# Patient Record
Sex: MALE | Race: WHITE | NOT HISPANIC OR LATINO | Employment: FULL TIME | URBAN - METROPOLITAN AREA
[De-identification: names, ages, dates, MRNs, and addresses within clinical notes are randomized per-mention and may not be internally consistent; named-entity substitution may affect disease eponyms.]

---

## 2023-04-06 LAB
ALANINE AMINOTRANSFERASE (SGPT) (U/L) IN SER/PLAS: 28 U/L (ref 10–52)
ALBUMIN (G/DL) IN SER/PLAS: 4.2 G/DL (ref 3.4–5)
ALKALINE PHOSPHATASE (U/L) IN SER/PLAS: 66 U/L (ref 33–136)
ANION GAP IN SER/PLAS: 14 MMOL/L (ref 10–20)
ASPARTATE AMINOTRANSFERASE (SGOT) (U/L) IN SER/PLAS: 23 U/L (ref 9–39)
BASOPHILS (10*3/UL) IN BLOOD BY AUTOMATED COUNT: 0.1 X10E9/L (ref 0–0.1)
BASOPHILS/100 LEUKOCYTES IN BLOOD BY AUTOMATED COUNT: 1.2 % (ref 0–2)
BILIRUBIN TOTAL (MG/DL) IN SER/PLAS: 1.5 MG/DL (ref 0–1.2)
CALCIUM (MG/DL) IN SER/PLAS: 9.5 MG/DL (ref 8.6–10.6)
CARBON DIOXIDE, TOTAL (MMOL/L) IN SER/PLAS: 26 MMOL/L (ref 21–32)
CHLORIDE (MMOL/L) IN SER/PLAS: 104 MMOL/L (ref 98–107)
CHOLESTEROL (MG/DL) IN SER/PLAS: 180 MG/DL (ref 0–199)
CHOLESTEROL IN HDL (MG/DL) IN SER/PLAS: 40.3 MG/DL
CHOLESTEROL/HDL RATIO: 4.5
CREATININE (MG/DL) IN SER/PLAS: 1.11 MG/DL (ref 0.5–1.3)
EOSINOPHILS (10*3/UL) IN BLOOD BY AUTOMATED COUNT: 0.21 X10E9/L (ref 0–0.7)
EOSINOPHILS/100 LEUKOCYTES IN BLOOD BY AUTOMATED COUNT: 2.6 % (ref 0–6)
ERYTHROCYTE DISTRIBUTION WIDTH (RATIO) BY AUTOMATED COUNT: 13.4 % (ref 11.5–14.5)
ERYTHROCYTE MEAN CORPUSCULAR HEMOGLOBIN CONCENTRATION (G/DL) BY AUTOMATED: 32.9 G/DL (ref 32–36)
ERYTHROCYTE MEAN CORPUSCULAR VOLUME (FL) BY AUTOMATED COUNT: 92 FL (ref 80–100)
ERYTHROCYTES (10*6/UL) IN BLOOD BY AUTOMATED COUNT: 5.19 X10E12/L (ref 4.5–5.9)
GFR MALE: 74 ML/MIN/1.73M2
GLUCOSE (MG/DL) IN SER/PLAS: 100 MG/DL (ref 74–99)
HEMATOCRIT (%) IN BLOOD BY AUTOMATED COUNT: 48 % (ref 41–52)
HEMOGLOBIN (G/DL) IN BLOOD: 15.8 G/DL (ref 13.5–17.5)
IMMATURE GRANULOCYTES/100 LEUKOCYTES IN BLOOD BY AUTOMATED COUNT: 0.4 % (ref 0–0.9)
LDL: 108 MG/DL (ref 0–99)
LEUKOCYTES (10*3/UL) IN BLOOD BY AUTOMATED COUNT: 8.1 X10E9/L (ref 4.4–11.3)
LYMPHOCYTES (10*3/UL) IN BLOOD BY AUTOMATED COUNT: 1.99 X10E9/L (ref 1.2–4.8)
LYMPHOCYTES/100 LEUKOCYTES IN BLOOD BY AUTOMATED COUNT: 24.5 % (ref 13–44)
MONOCYTES (10*3/UL) IN BLOOD BY AUTOMATED COUNT: 0.65 X10E9/L (ref 0.1–1)
MONOCYTES/100 LEUKOCYTES IN BLOOD BY AUTOMATED COUNT: 8 % (ref 2–10)
NEUTROPHILS (10*3/UL) IN BLOOD BY AUTOMATED COUNT: 5.14 X10E9/L (ref 1.2–7.7)
NEUTROPHILS/100 LEUKOCYTES IN BLOOD BY AUTOMATED COUNT: 63.3 % (ref 40–80)
NRBC (PER 100 WBCS) BY AUTOMATED COUNT: 0 /100 WBC (ref 0–0)
PLATELETS (10*3/UL) IN BLOOD AUTOMATED COUNT: 203 X10E9/L (ref 150–450)
POTASSIUM (MMOL/L) IN SER/PLAS: 4.3 MMOL/L (ref 3.5–5.3)
PROTEIN TOTAL: 7.1 G/DL (ref 6.4–8.2)
SODIUM (MMOL/L) IN SER/PLAS: 140 MMOL/L (ref 136–145)
TRIGLYCERIDE (MG/DL) IN SER/PLAS: 158 MG/DL (ref 0–149)
UREA NITROGEN (MG/DL) IN SER/PLAS: 15 MG/DL (ref 6–23)
VLDL: 32 MG/DL (ref 0–40)

## 2023-04-27 ENCOUNTER — OFFICE VISIT (OUTPATIENT)
Dept: PRIMARY CARE | Facility: CLINIC | Age: 63
End: 2023-04-27
Payer: COMMERCIAL

## 2023-04-27 ENCOUNTER — LAB (OUTPATIENT)
Dept: LAB | Facility: LAB | Age: 63
End: 2023-04-27
Payer: COMMERCIAL

## 2023-04-27 VITALS
SYSTOLIC BLOOD PRESSURE: 181 MMHG | WEIGHT: 232 LBS | HEIGHT: 72 IN | HEART RATE: 74 BPM | BODY MASS INDEX: 31.42 KG/M2 | OXYGEN SATURATION: 96 % | DIASTOLIC BLOOD PRESSURE: 88 MMHG

## 2023-04-27 DIAGNOSIS — Z00.00 WELL ADULT EXAM: Primary | ICD-10-CM

## 2023-04-27 DIAGNOSIS — Z00.00 WELL ADULT EXAM: ICD-10-CM

## 2023-04-27 PROCEDURE — 36415 COLL VENOUS BLD VENIPUNCTURE: CPT

## 2023-04-27 PROCEDURE — 84153 ASSAY OF PSA TOTAL: CPT

## 2023-04-27 PROCEDURE — 99396 PREV VISIT EST AGE 40-64: CPT | Performed by: FAMILY MEDICINE

## 2023-04-27 RX ORDER — PANTOPRAZOLE SODIUM 40 MG/1
40 TABLET, DELAYED RELEASE ORAL
COMMUNITY
Start: 2023-01-20 | End: 2024-04-29 | Stop reason: SDUPTHER

## 2023-04-27 RX ORDER — EPINEPHRINE 0.3 MG/.3ML
INJECTION SUBCUTANEOUS
COMMUNITY
Start: 2017-08-09 | End: 2023-12-15 | Stop reason: WASHOUT

## 2023-04-27 RX ORDER — ASPIRIN 81 MG/1
81 TABLET ORAL DAILY
COMMUNITY
End: 2023-12-18 | Stop reason: SDUPTHER

## 2023-04-27 RX ORDER — FAMOTIDINE 20 MG/1
1 TABLET, FILM COATED ORAL DAILY
COMMUNITY
Start: 2019-02-01 | End: 2023-12-15 | Stop reason: WASHOUT

## 2023-04-27 RX ORDER — PRAVASTATIN SODIUM 40 MG/1
1 TABLET ORAL DAILY
COMMUNITY
Start: 2021-11-23 | End: 2024-02-08 | Stop reason: SDUPTHER

## 2023-04-27 RX ORDER — METOPROLOL SUCCINATE 50 MG/1
1 TABLET, EXTENDED RELEASE ORAL DAILY
COMMUNITY
Start: 2022-12-28 | End: 2023-12-15 | Stop reason: WASHOUT

## 2023-04-27 RX ORDER — LOSARTAN POTASSIUM 25 MG/1
1 TABLET ORAL DAILY
COMMUNITY
Start: 2018-08-03 | End: 2023-12-18 | Stop reason: DRUGHIGH

## 2023-04-27 RX ORDER — ROSUVASTATIN CALCIUM 5 MG/1
5 TABLET, COATED ORAL
COMMUNITY
Start: 2018-05-08 | End: 2023-04-27 | Stop reason: SINTOL

## 2023-04-27 RX ORDER — APIXABAN 5 MG/1
1 TABLET, FILM COATED ORAL 2 TIMES DAILY
COMMUNITY
End: 2024-02-05 | Stop reason: SDUPTHER

## 2023-04-27 ASSESSMENT — ENCOUNTER SYMPTOMS
EYES NEGATIVE: 1
GASTROINTESTINAL NEGATIVE: 1
MUSCULOSKELETAL NEGATIVE: 1
CARDIOVASCULAR NEGATIVE: 1
CONSTITUTIONAL NEGATIVE: 1
PSYCHIATRIC NEGATIVE: 1
RESPIRATORY NEGATIVE: 1

## 2023-04-27 NOTE — PROGRESS NOTES
Subjective   Patient ID: Tani Mo is a 62 y.o. male who presents for Annual Exam.  HPI  Pt sp hosp with a fib and for physical exam patient has no history of issues since admission and discharge.  Patient had a colonoscopy had a couple polyps that were removed  Review of Systems   Constitutional: Negative.    HENT: Negative.     Eyes: Negative.    Respiratory: Negative.     Cardiovascular: Negative.    Gastrointestinal: Negative.    Genitourinary: Negative.    Musculoskeletal: Negative.    Psychiatric/Behavioral: Negative.         Objective   Physical Exam  General no acute process no icterus well-hydrated alert active oriented    HEENT normocephalic no palpable tenderness eyes pupils equal reactive light and accommodation extraocular muscles intact no icterus and/or erythema ears benign external auditory canal no gross deformities nose no discharge drainage erythema bleeding throat no erythema.    Heart regular rate and rhythm without S3-S4 or murmur    Lungs clear to auscultation x2 no rales or rhonchi    Abdomen soft nontender nondistended no palpable masses no organomegaly splenomegaly.    Integument no rash no lumps bumps or concerning lesions.    Neurologic no tics tremors or seizures no decreased range of motion or ataxia.    Musculoskeletal good range of motion no gross abnormalities noted  Assessment/Plan

## 2023-04-28 LAB — PROSTATE SPECIFIC AG (NG/ML) IN SER/PLAS: 0.33 NG/ML (ref 0–4)

## 2023-10-27 PROBLEM — I10 BENIGN ESSENTIAL HYPERTENSION: Status: ACTIVE | Noted: 2023-10-27

## 2023-10-27 PROBLEM — E78.5 HYPERLIPIDEMIA: Status: ACTIVE | Noted: 2023-10-27

## 2023-10-27 PROBLEM — K21.9 GERD (GASTROESOPHAGEAL REFLUX DISEASE): Status: ACTIVE | Noted: 2023-10-27

## 2023-10-27 PROBLEM — I25.10 CORONARY ARTERY DISEASE: Status: ACTIVE | Noted: 2023-10-27

## 2023-10-27 PROBLEM — I48.91 ATRIAL FIBRILLATION WITH RVR (MULTI): Status: ACTIVE | Noted: 2023-04-04

## 2023-10-27 PROBLEM — E78.00 HYPERCHOLESTEROLEMIA: Status: ACTIVE | Noted: 2023-10-27

## 2023-10-27 PROBLEM — R00.2 PALPITATIONS: Status: ACTIVE | Noted: 2023-10-27

## 2023-10-27 PROBLEM — I10 HTN (HYPERTENSION): Status: ACTIVE | Noted: 2023-10-27

## 2023-10-27 PROBLEM — R07.89 CHEST PAIN, MIDSTERNAL: Status: ACTIVE | Noted: 2023-10-27

## 2023-10-27 PROBLEM — I73.9 CLAUDICATION (CMS-HCC): Status: ACTIVE | Noted: 2023-10-27

## 2023-10-27 PROBLEM — I48.0 PAROXYSMAL ATRIAL FIBRILLATION (MULTI): Status: ACTIVE | Noted: 2023-10-27

## 2023-10-27 RX ORDER — METOPROLOL TARTRATE 50 MG/1
50 TABLET ORAL 2 TIMES DAILY
COMMUNITY
Start: 2021-12-27 | End: 2023-12-15 | Stop reason: WASHOUT

## 2023-10-27 RX ORDER — CLOPIDOGREL BISULFATE 75 MG/1
75 TABLET ORAL EVERY MORNING
COMMUNITY
End: 2023-12-15 | Stop reason: WASHOUT

## 2023-10-27 RX ORDER — UBIDECARENONE 30 MG
30 CAPSULE ORAL EVERY MORNING
COMMUNITY
End: 2023-12-15 | Stop reason: WASHOUT

## 2023-10-27 RX ORDER — METOPROLOL TARTRATE 25 MG/1
25 TABLET, FILM COATED ORAL 2 TIMES DAILY
COMMUNITY
End: 2023-12-15 | Stop reason: WASHOUT

## 2023-10-27 RX ORDER — SODIUM, POTASSIUM,MAG SULFATES 17.5-3.13G
SOLUTION, RECONSTITUTED, ORAL ORAL
COMMUNITY
Start: 2023-01-20 | End: 2023-12-18 | Stop reason: ALTCHOICE

## 2023-10-27 RX ORDER — EZETIMIBE 10 MG/1
10 TABLET ORAL EVERY MORNING
COMMUNITY
Start: 2023-06-09 | End: 2023-12-11

## 2023-10-27 RX ORDER — ACETAMINOPHEN 160 MG/5ML
SUSPENSION, ORAL (FINAL DOSE FORM) ORAL
COMMUNITY
Start: 2019-02-01

## 2023-10-27 RX ORDER — METOPROLOL SUCCINATE 25 MG/1
25 TABLET, EXTENDED RELEASE ORAL EVERY MORNING
COMMUNITY
Start: 2023-04-29 | End: 2023-12-15 | Stop reason: WASHOUT

## 2023-10-27 RX ORDER — DILTIAZEM HYDROCHLORIDE 180 MG/1
1 CAPSULE, EXTENDED RELEASE ORAL DAILY
COMMUNITY
Start: 2023-06-09 | End: 2023-12-15 | Stop reason: WASHOUT

## 2023-10-27 RX ORDER — PRAVASTATIN SODIUM 20 MG/1
20 TABLET ORAL DAILY
COMMUNITY
Start: 2021-12-25 | End: 2023-12-18 | Stop reason: ALTCHOICE

## 2023-10-27 RX ORDER — PANTOPRAZOLE SODIUM 20 MG/1
1 TABLET, DELAYED RELEASE ORAL DAILY
COMMUNITY
Start: 2022-11-17 | End: 2023-12-15 | Stop reason: WASHOUT

## 2023-10-30 ENCOUNTER — OFFICE VISIT (OUTPATIENT)
Dept: PRIMARY CARE | Facility: CLINIC | Age: 63
End: 2023-10-30
Payer: COMMERCIAL

## 2023-10-30 VITALS
OXYGEN SATURATION: 93 % | HEIGHT: 72 IN | HEART RATE: 64 BPM | WEIGHT: 235 LBS | BODY MASS INDEX: 31.83 KG/M2 | SYSTOLIC BLOOD PRESSURE: 162 MMHG | DIASTOLIC BLOOD PRESSURE: 90 MMHG

## 2023-10-30 DIAGNOSIS — I10 BENIGN ESSENTIAL HYPERTENSION: Primary | ICD-10-CM

## 2023-10-30 DIAGNOSIS — I25.10 CORONARY ARTERY DISEASE INVOLVING NATIVE HEART WITHOUT ANGINA PECTORIS, UNSPECIFIED VESSEL OR LESION TYPE: ICD-10-CM

## 2023-10-30 PROCEDURE — 3077F SYST BP >= 140 MM HG: CPT | Performed by: FAMILY MEDICINE

## 2023-10-30 PROCEDURE — 99213 OFFICE O/P EST LOW 20 MIN: CPT | Performed by: FAMILY MEDICINE

## 2023-10-30 PROCEDURE — 3080F DIAST BP >= 90 MM HG: CPT | Performed by: FAMILY MEDICINE

## 2023-10-30 ASSESSMENT — PATIENT HEALTH QUESTIONNAIRE - PHQ9
SUM OF ALL RESPONSES TO PHQ9 QUESTIONS 1 AND 2: 0
2. FEELING DOWN, DEPRESSED OR HOPELESS: NOT AT ALL
1. LITTLE INTEREST OR PLEASURE IN DOING THINGS: NOT AT ALL

## 2023-10-30 NOTE — PROGRESS NOTES
Subjective   Patient ID: Tani Mo is a 63 y.o. male who presents for Follow-up (6 months).  HPI  Doing well no acute process   Review of Systems   Constitutional: Negative.    HENT: Negative.     Respiratory: Negative.     Cardiovascular: Negative.    Gastrointestinal: Negative.    Genitourinary: Negative.    Musculoskeletal: Negative.    Neurological: Negative.      Patient in for follow-up of blood pressure asymptomatic at this time needs follow-up medications as well as blood work.  Objective   Physical Exam  General no acute process no icterus well-hydrated alert active oriented    HEENT normocephalic no palpable tenderness eyes pupils equal reactive light and accommodation extraocular muscles intact no icterus and/or erythema ears benign external auditory canal no gross deformities nose no discharge drainage erythema bleeding throat no erythema.    Heart regular rate and rhythm without S3-S4 or murmur    Lungs clear to auscultation x2 no rales or rhonchi    Abdomen soft nontender nondistended no palpable masses no organomegaly splenomegaly.    Integument no rash no lumps bumps or concerning lesions.    Neurologic no tics tremors or seizures no decreased range of motion or ataxia.    Musculoskeletal good range of motion no gross abnormalities noted  Assessment/Plan

## 2023-11-02 ASSESSMENT — ENCOUNTER SYMPTOMS
NEUROLOGICAL NEGATIVE: 1
MUSCULOSKELETAL NEGATIVE: 1
CARDIOVASCULAR NEGATIVE: 1
RESPIRATORY NEGATIVE: 1
CONSTITUTIONAL NEGATIVE: 1
GASTROINTESTINAL NEGATIVE: 1

## 2023-12-10 DIAGNOSIS — E78.49 OTHER HYPERLIPIDEMIA: ICD-10-CM

## 2023-12-10 DIAGNOSIS — I10 BENIGN ESSENTIAL HYPERTENSION: Primary | ICD-10-CM

## 2023-12-11 RX ORDER — DILTIAZEM HYDROCHLORIDE 180 MG/1
180 CAPSULE, COATED, EXTENDED RELEASE ORAL DAILY
Qty: 90 CAPSULE | Refills: 3 | Status: SHIPPED | OUTPATIENT
Start: 2023-12-11 | End: 2024-06-04

## 2023-12-11 RX ORDER — EZETIMIBE 10 MG/1
10 TABLET ORAL DAILY
Qty: 90 TABLET | Refills: 3 | Status: SHIPPED | OUTPATIENT
Start: 2023-12-11 | End: 2024-06-04

## 2023-12-15 PROBLEM — E78.00 HYPERCHOLESTEROLEMIA: Status: RESOLVED | Noted: 2023-10-27 | Resolved: 2023-12-15

## 2023-12-15 PROBLEM — I10 HTN (HYPERTENSION): Status: RESOLVED | Noted: 2023-10-27 | Resolved: 2023-12-15

## 2023-12-15 PROBLEM — I48.91 ATRIAL FIBRILLATION WITH RVR (MULTI): Status: RESOLVED | Noted: 2023-04-04 | Resolved: 2023-12-15

## 2023-12-18 ENCOUNTER — OFFICE VISIT (OUTPATIENT)
Dept: CARDIOLOGY | Facility: CLINIC | Age: 63
End: 2023-12-18
Payer: COMMERCIAL

## 2023-12-18 VITALS
SYSTOLIC BLOOD PRESSURE: 160 MMHG | BODY MASS INDEX: 31.83 KG/M2 | HEIGHT: 72 IN | DIASTOLIC BLOOD PRESSURE: 84 MMHG | OXYGEN SATURATION: 95 % | HEART RATE: 75 BPM | WEIGHT: 235 LBS

## 2023-12-18 DIAGNOSIS — I48.0 PAROXYSMAL ATRIAL FIBRILLATION (MULTI): ICD-10-CM

## 2023-12-18 DIAGNOSIS — I10 BENIGN ESSENTIAL HYPERTENSION: Primary | ICD-10-CM

## 2023-12-18 PROCEDURE — 3079F DIAST BP 80-89 MM HG: CPT | Performed by: INTERNAL MEDICINE

## 2023-12-18 PROCEDURE — 1159F MED LIST DOCD IN RCRD: CPT | Performed by: INTERNAL MEDICINE

## 2023-12-18 PROCEDURE — 93000 ELECTROCARDIOGRAM COMPLETE: CPT | Performed by: INTERNAL MEDICINE

## 2023-12-18 PROCEDURE — 3077F SYST BP >= 140 MM HG: CPT | Performed by: INTERNAL MEDICINE

## 2023-12-18 PROCEDURE — 99213 OFFICE O/P EST LOW 20 MIN: CPT | Performed by: INTERNAL MEDICINE

## 2023-12-18 RX ORDER — ASPIRIN 81 MG/1
81 TABLET ORAL DAILY
Qty: 90 TABLET | Refills: 3 | Status: SHIPPED | OUTPATIENT
Start: 2023-12-18 | End: 2024-12-17

## 2023-12-18 RX ORDER — LOSARTAN POTASSIUM 50 MG/1
50 TABLET ORAL DAILY
Qty: 90 TABLET | Refills: 3 | Status: SHIPPED | OUTPATIENT
Start: 2023-12-18 | End: 2024-12-17

## 2023-12-18 NOTE — ASSESSMENT & PLAN NOTE
1.  Paroxysmal atrial fibrillation: The patient underwent a cryo PVI procedure in November 2022.  He had 1 episode of atrial fibrillation with RVR in April 2023 and otherwise has maintained sinus rhythm.  Continue current medication regimen.    2.  Hypertension: Losartan will be increased to 50 mg daily.    3.  Coronary artery disease: The patient underwent successful revascularization of the RCA .  No angina at this time.  Continue same medication regimen and follow-up with Dr. Jones as scheduled.

## 2023-12-18 NOTE — PROGRESS NOTES
History Of Present Illness:      This is a 63-year-old male with a history of atrial fibrillation.  He has no complaints of palpitations, chest pain, or shortness of breath.  No recent episodes of atrial fibrillation.  Pulmonary vein isolation was performed November 17, 2022.    Past medical history:    Paroxysmal atrial fibrillation: History of frequent episodes of atrial fibrillation, at least 1 episode per month with episodes lasting 12 to 24 hours.  Pulmonary vein isolation performed November 2022.  Coronary artery disease with history of  PCI of the RCA in July 2018  Hypertension  Hyperlipidemia    Review of Systems  Other review of systems negative     Last Recorded Vitals:      12/9/2022     1:43 PM 12/28/2022    11:03 AM 4/5/2023    10:47 AM 4/27/2023     3:04 PM 6/9/2023     2:41 PM 10/30/2023     3:36 PM 12/18/2023     3:16 PM   Vitals   Systolic 123 140 148 181 120 162 160   Diastolic 77 92 98 88 68 90 84   Heart Rate 58 60 67 74 77 64 75   Height (in) 1.829 m (6') 1.829 m (6') 1.829 m (6') 1.829 m (6') 1.829 m (6') 1.829 m (6') 1.829 m (6')   Weight (lb) 233 233.13 230 232 228.38 235 235   BMI 31.6 kg/m2 31.62 kg/m2 31.19 kg/m2 31.46 kg/m2 30.97 kg/m2 31.87 kg/m2 31.87 kg/m2   BSA (m2) 2.32 m2 2.32 m2 2.3 m2 2.31 m2 2.3 m2 2.33 m2 2.33 m2   Visit Report    Report  Report Report          Allergies:  Bee venom protein (honey bee)    Outpatient Medications:  Current Outpatient Medications   Medication Instructions    aspirin 81 mg, oral, Daily    coenzyme Q-10 200 mg capsule TAKE AS DIRECTED.<BR>    dilTIAZem CD (CARDIZEM CD) 180 mg, oral, Daily    Eliquis 5 mg tablet 1 tablet, oral, 2 times daily    ezetimibe (ZETIA) 10 mg, oral, Daily    losartan (Cozaar) 25 mg tablet 1 tablet, oral, Daily    multivitamin (MULTIPLE VITAMINS ORAL) 1 tablet, oral, Daily    pantoprazole (PROTONIX) 40 mg, oral, Daily before breakfast    pravastatin (Pravachol) 40 mg tablet 1 tablet, oral, Daily    ubidecarenone (COENZYME  Q10, BULK, MISC) oral       Physical Exam:    General Appearance:  Alert, oriented, no distress  Skin:  Warm and dry  Head and Neck:  No elevation of JVP, no carotid bruits  Cardiac Exam:  Rhythm is regular, S1 and S2 are normal, no murmur S3 or S4  Lungs:  Clear to auscultation  Extremities:  no edema  Neurologic:  No focal deficits  Psychiatric:  Appropriate mood and behavior         Cardiology Tests:  I have personally review the diagnostic cardiac testing and my interpretation is as follows:    EKG December 18, 2023: Sinus rhythm, no ischemic changes  Echocardiogram September 2022: Normal left ventricular function.      Assessment/Plan   Problem List Items Addressed This Visit             ICD-10-CM    Benign essential hypertension - Primary I10    Paroxysmal atrial fibrillation (CMS/HCC) I48.0     1.  Paroxysmal atrial fibrillation: The patient underwent a cryo PVI procedure in November 2022.  He had 1 episode of atrial fibrillation with RVR in April 2023 and otherwise has maintained sinus rhythm.  Continue current medication regimen.    2.  Hypertension: Losartan will be increased to 50 mg daily.    3.  Coronary artery disease: The patient underwent successful revascularization of the RCA .  No angina at this time.  Continue same medication regimen and follow-up with Dr. Jones as scheduled.         Relevant Medications    aspirin 81 mg EC tablet    losartan (Cozaar) 50 mg tablet    Other Relevant Orders    ECG 12 lead (Clinic Performed) (Completed)       James C Ramicone, DO

## 2023-12-26 LAB
NON-UH HIE A/G RATIO: 1.3
NON-UH HIE ALB: 3.8 G/DL (ref 3.4–5)
NON-UH HIE ALK PHOS: 68 UNIT/L (ref 45–117)
NON-UH HIE BILIRUBIN, TOTAL: 0.8 MG/DL (ref 0.3–1.2)
NON-UH HIE BUN/CREAT RATIO: 13.6
NON-UH HIE BUN: 15 MG/DL (ref 9–23)
NON-UH HIE CALCIUM: 9.1 MG/DL (ref 8.7–10.4)
NON-UH HIE CALCULATED LDL CHOLESTEROL: 72 MG/DL (ref 60–130)
NON-UH HIE CALCULATED OSMOLALITY: 286 MOSM/KG (ref 275–295)
NON-UH HIE CHLORIDE: 109 MMOL/L (ref 98–107)
NON-UH HIE CHOLESTEROL: 132 MG/DL (ref 100–200)
NON-UH HIE CO2, VENOUS: 26 MMOL/L (ref 20–31)
NON-UH HIE CREATININE: 1.1 MG/DL (ref 0.6–1.1)
NON-UH HIE GFR AA: >60
NON-UH HIE GLOBULIN: 2.9 G/DL
NON-UH HIE GLOMERULAR FILTRATION RATE: >60 ML/MIN/1.73M?
NON-UH HIE GLUCOSE: 103 MG/DL (ref 74–106)
NON-UH HIE GOT: 22 UNIT/L (ref 15–37)
NON-UH HIE GPT: 30 UNIT/L (ref 10–49)
NON-UH HIE HDL CHOLESTEROL: 38 MG/DL (ref 40–60)
NON-UH HIE K: 4.3 MMOL/L (ref 3.5–5.1)
NON-UH HIE NA: 143 MMOL/L (ref 135–145)
NON-UH HIE TOTAL CHOL/HDL CHOL RATIO: 3.5
NON-UH HIE TOTAL PROTEIN: 6.7 G/DL (ref 5.7–8.2)
NON-UH HIE TRIGLYCERIDES: 109 MG/DL (ref 30–150)

## 2024-02-05 DIAGNOSIS — I48.0 PAROXYSMAL ATRIAL FIBRILLATION (MULTI): ICD-10-CM

## 2024-02-05 RX ORDER — APIXABAN 5 MG/1
5 TABLET, FILM COATED ORAL 2 TIMES DAILY
Qty: 180 TABLET | Refills: 3 | Status: SHIPPED | OUTPATIENT
Start: 2024-02-05

## 2024-02-08 DIAGNOSIS — E78.5 HYPERLIPIDEMIA, UNSPECIFIED HYPERLIPIDEMIA TYPE: ICD-10-CM

## 2024-02-08 RX ORDER — PRAVASTATIN SODIUM 40 MG/1
40 TABLET ORAL DAILY
Qty: 90 TABLET | Refills: 3 | Status: SHIPPED | OUTPATIENT
Start: 2024-02-08

## 2024-04-29 ENCOUNTER — HOSPITAL ENCOUNTER (OUTPATIENT)
Dept: RADIOLOGY | Facility: CLINIC | Age: 64
Discharge: HOME | End: 2024-04-29
Payer: COMMERCIAL

## 2024-04-29 ENCOUNTER — OFFICE VISIT (OUTPATIENT)
Dept: PRIMARY CARE | Facility: CLINIC | Age: 64
End: 2024-04-29
Payer: COMMERCIAL

## 2024-04-29 VITALS
HEART RATE: 69 BPM | OXYGEN SATURATION: 94 % | HEIGHT: 72 IN | BODY MASS INDEX: 30.75 KG/M2 | DIASTOLIC BLOOD PRESSURE: 74 MMHG | WEIGHT: 227 LBS | SYSTOLIC BLOOD PRESSURE: 118 MMHG

## 2024-04-29 DIAGNOSIS — G62.9 NEUROPATHY: ICD-10-CM

## 2024-04-29 DIAGNOSIS — E78.5 HYPERLIPIDEMIA, UNSPECIFIED HYPERLIPIDEMIA TYPE: Primary | ICD-10-CM

## 2024-04-29 DIAGNOSIS — G47.00 INSOMNIA, UNSPECIFIED TYPE: ICD-10-CM

## 2024-04-29 DIAGNOSIS — I48.0 PAROXYSMAL ATRIAL FIBRILLATION (MULTI): ICD-10-CM

## 2024-04-29 DIAGNOSIS — T78.40XS ALLERGIC REACTION, SEQUELA: ICD-10-CM

## 2024-04-29 DIAGNOSIS — K21.9 GASTROESOPHAGEAL REFLUX DISEASE WITHOUT ESOPHAGITIS: ICD-10-CM

## 2024-04-29 PROCEDURE — 99214 OFFICE O/P EST MOD 30 MIN: CPT | Performed by: FAMILY MEDICINE

## 2024-04-29 PROCEDURE — 1036F TOBACCO NON-USER: CPT | Performed by: FAMILY MEDICINE

## 2024-04-29 PROCEDURE — 1123F ACP DISCUSS/DSCN MKR DOCD: CPT | Performed by: FAMILY MEDICINE

## 2024-04-29 PROCEDURE — 1158F ADVNC CARE PLAN TLK DOCD: CPT | Performed by: FAMILY MEDICINE

## 2024-04-29 PROCEDURE — 72040 X-RAY EXAM NECK SPINE 2-3 VW: CPT

## 2024-04-29 PROCEDURE — 3078F DIAST BP <80 MM HG: CPT | Performed by: FAMILY MEDICINE

## 2024-04-29 PROCEDURE — 72040 X-RAY EXAM NECK SPINE 2-3 VW: CPT | Performed by: RADIOLOGY

## 2024-04-29 PROCEDURE — 3074F SYST BP LT 130 MM HG: CPT | Performed by: FAMILY MEDICINE

## 2024-04-29 RX ORDER — EPINEPHRINE 0.3 MG/.3ML
1 INJECTION SUBCUTANEOUS AS NEEDED
Qty: 2 EACH | Refills: 1 | Status: SHIPPED | OUTPATIENT
Start: 2024-04-29 | End: 2025-04-29

## 2024-04-29 RX ORDER — PANTOPRAZOLE SODIUM 40 MG/1
40 TABLET, DELAYED RELEASE ORAL
Qty: 30 TABLET | Refills: 1 | Status: SHIPPED | OUTPATIENT
Start: 2024-04-29

## 2024-04-29 ASSESSMENT — PATIENT HEALTH QUESTIONNAIRE - PHQ9
1. LITTLE INTEREST OR PLEASURE IN DOING THINGS: NOT AT ALL
SUM OF ALL RESPONSES TO PHQ9 QUESTIONS 1 AND 2: 0
2. FEELING DOWN, DEPRESSED OR HOPELESS: NOT AT ALL

## 2024-04-29 ASSESSMENT — ENCOUNTER SYMPTOMS
RESPIRATORY NEGATIVE: 1
ROS GI COMMENTS: GASTROESOPHAGEAL REFLUX DISEASE
NECK PAIN: 1
NUMBNESS: 1
GASTROINTESTINAL NEGATIVE: 1
ARTHRALGIAS: 1
CONSTITUTIONAL NEGATIVE: 1

## 2024-04-29 NOTE — PROGRESS NOTES
Subjective   Patient ID: Tani Mo is a 63 y.o. male who presents for Follow-up (6 months).  HPI  L arm issues with numbness intermittantly worse at night co gerd patient in for follow-up of hyperlipidemia paroxysmal atrial fibs hypertension.  Doing well on taking medications patient had blood work done in December which was good needs follow-up blood work in June I ordered that.    Patient also complains of left arm pain and numbness he says it worse at night when his arms over his head in the sleeping he wakes up he has some numbness and tingling into the left arm patient states he has no loss of strength no weakness.  Patient has no history of trauma does have some intermittent neck pain.  Will go ahead and get an EMG and x-ray of his neck and see how that comes out.    Patient also complains of recurrence of his gastroesophageal reflux we will put him back on pantoprazole.  Review of Systems   Constitutional: Negative.    HENT: Negative.     Respiratory: Negative.     Gastrointestinal: Negative.         Gastroesophageal reflux disease   Genitourinary: Negative.    Musculoskeletal:  Positive for arthralgias and neck pain.   Neurological:  Positive for numbness.       Objective   Physical Exam  General no acute process no icterus well-hydrated alert active oriented    HEENT normocephalic no palpable tenderness eyes pupils equal reactive light and accommodation extraocular muscles intact no icterus and/or erythema ears benign external auditory canal no gross deformities nose no discharge drainage erythema bleeding throat no erythema.    Heart regular rate and rhythm without S3-S4 or murmur    Lungs clear to auscultation x2 no rales or rhonchi    Abdomen soft nontender nondistended no palpable masses no organomegaly splenomegaly.    Integument no rash no lumps bumps or concerning lesions.    Neurologic no tics tremors or seizures no decreased range of motion or ataxia.    Musculoskeletal good range of motion  no gross abnormalities noted  Assessment/Plan   Problem List Items Addressed This Visit             ICD-10-CM    Hyperlipidemia - Primary E78.5    GERD (gastroesophageal reflux disease) K21.9    Relevant Medications    pantoprazole (ProtoNix) 40 mg EC tablet    Paroxysmal atrial fibrillation (Multi) I48.0     Other Visit Diagnoses         Codes    Insomnia, unspecified type     G47.00    Relevant Orders    Home sleep apnea test (HSAT)    Neuropathy     G62.9    Relevant Orders    EMG & nerve conduction                 Franklyn Velasquez DO 04/29/24 3:44 PM

## 2024-05-06 ENCOUNTER — TELEPHONE (OUTPATIENT)
Dept: PRIMARY CARE | Facility: CLINIC | Age: 64
End: 2024-05-06
Payer: COMMERCIAL

## 2024-05-06 NOTE — TELEPHONE ENCOUNTER
I left patient a message. He needs to move his appointment for 10/29/24 up, as Dr. Davison is not seeing patients that late.

## 2024-05-09 NOTE — RESULT ENCOUNTER NOTE
Osteoarthritis of the cervical spine.  They also see a possible bony lesion underneath the left jaw that will need follow-up in the office

## 2024-06-03 DIAGNOSIS — I10 BENIGN ESSENTIAL HYPERTENSION: ICD-10-CM

## 2024-06-04 DIAGNOSIS — E78.49 OTHER HYPERLIPIDEMIA: ICD-10-CM

## 2024-06-04 RX ORDER — DILTIAZEM HYDROCHLORIDE 180 MG/1
180 CAPSULE, COATED, EXTENDED RELEASE ORAL DAILY
Qty: 90 CAPSULE | Refills: 3 | Status: SHIPPED | OUTPATIENT
Start: 2024-06-04

## 2024-06-04 RX ORDER — EZETIMIBE 10 MG/1
10 TABLET ORAL DAILY
Qty: 90 TABLET | Refills: 3 | Status: SHIPPED | OUTPATIENT
Start: 2024-06-04

## 2024-06-28 DIAGNOSIS — K21.9 GASTROESOPHAGEAL REFLUX DISEASE WITHOUT ESOPHAGITIS: ICD-10-CM

## 2024-06-28 RX ORDER — PANTOPRAZOLE SODIUM 40 MG/1
40 TABLET, DELAYED RELEASE ORAL
Qty: 30 TABLET | Refills: 1 | Status: SHIPPED | OUTPATIENT
Start: 2024-06-28

## 2024-07-01 ENCOUNTER — PROCEDURE VISIT (OUTPATIENT)
Dept: SLEEP MEDICINE | Facility: CLINIC | Age: 64
End: 2024-07-01
Payer: COMMERCIAL

## 2024-07-01 DIAGNOSIS — G47.00 INSOMNIA, UNSPECIFIED TYPE: ICD-10-CM

## 2024-07-01 NOTE — PROGRESS NOTES
Type of Study: HOME SLEEP STUDY - NOMAD     The patient received equipment and instructions for use of the [facility] Nomad HSAT device. The patient was instructed how to apply the effort belts, cannula, thermistor. It was also explained how the Nomad and oximeter components work.  The patient was asked to record their sleep for an 8-hour period.     The patient was informed of their responsibility for the device and acknowledged this by signing the HSAT device contract. The patient was asked to return the device on 7/2/2024 between the hours of 6:30 PM- 6:30 AM to the [facility].     The patient was instructed to call 911 as usual for any medical- emergencies while at home.  The patient was also given a phone number for troubleshooting when using the device in case there were additional questions.

## 2024-07-30 ENCOUNTER — CLINICAL SUPPORT (OUTPATIENT)
Dept: SLEEP MEDICINE | Facility: CLINIC | Age: 64
End: 2024-07-30
Payer: COMMERCIAL

## 2024-07-30 DIAGNOSIS — G47.00 INSOMNIA, UNSPECIFIED TYPE: ICD-10-CM

## 2024-07-30 NOTE — PROGRESS NOTES
Type of Study: HOME SLEEP STUDY - NOMAD     The patient received equipment and instructions for use of the [facility] [facility] Nom[facility] HSAT device. The patient was instructed how to apply the effort belts, cannula, thermistor. It was also explained how the Nomad and oximeter components work.  The patient was asked to record their sleep for an 8-hour period.     The patient was informed of their responsibility for the device and acknowledged this by signing the HSAT device contract. The patient was asked to return the device on 07/31/2024 between the hours of 06:30 am-06:30 pm to the Sleep Center.     The patient was instructed to call 911 as usual for any medical- emergencies while at home.  The patient was also given a phone number for troubleshooting when using the device in case there were additional questions.

## 2024-08-02 ASSESSMENT — SLEEP AND FATIGUE QUESTIONNAIRES
ESS-CHAD TOTAL SCORE: 7
HOW LIKELY ARE YOU TO NOD OFF OR FALL ASLEEP WHILE SITTING QUIETLY AFTER LUNCH WITHOUT ALCOHOL: WOULD NEVER DOZE
HOW LIKELY ARE YOU TO NOD OFF OR FALL ASLEEP WHILE LYING DOWN TO REST IN THE AFTERNOON WHEN CIRCUMSTANCES PERMIT: MODERATE CHANCE OF DOZING
HOW LIKELY ARE YOU TO NOD OFF OR FALL ASLEEP WHILE WATCHING TV: SLIGHT CHANCE OF DOZING
SITING INACTIVE IN A PUBLIC PLACE LIKE A CLASS ROOM OR A MOVIE THEATER: MODERATE CHANCE OF DOZING
HOW LIKELY ARE YOU TO NOD OFF OR FALL ASLEEP WHILE SITTING AND TALKING TO SOMEONE: WOULD NEVER DOZE
HOW LIKELY ARE YOU TO NOD OFF OR FALL ASLEEP WHEN YOU ARE A PASSENGER IN A CAR FOR AN HOUR WITHOUT A BREAK: WOULD NEVER DOZE
HOW LIKELY ARE YOU TO NOD OFF OR FALL ASLEEP IN A CAR, WHILE STOPPED FOR A FEW MINUTES IN TRAFFIC: WOULD NEVER DOZE
HOW LIKELY ARE YOU TO NOD OFF OR FALL ASLEEP WHILE SITTING AND READING: MODERATE CHANCE OF DOZING

## 2024-08-28 ENCOUNTER — APPOINTMENT (OUTPATIENT)
Dept: CARDIOLOGY | Facility: CLINIC | Age: 64
End: 2024-08-28
Payer: COMMERCIAL

## 2024-09-05 NOTE — PROGRESS NOTES
Patient had an inconclusive HSAT sleep study.    Patient should be rescheduled for repeat attempt of HSAT or an in-lab sleep study.

## 2024-09-06 DIAGNOSIS — K21.9 GASTROESOPHAGEAL REFLUX DISEASE WITHOUT ESOPHAGITIS: ICD-10-CM

## 2024-09-06 RX ORDER — PANTOPRAZOLE SODIUM 40 MG/1
40 TABLET, DELAYED RELEASE ORAL
Qty: 30 TABLET | Refills: 1 | Status: SHIPPED | OUTPATIENT
Start: 2024-09-06

## 2024-09-10 ENCOUNTER — APPOINTMENT (OUTPATIENT)
Dept: CARDIOLOGY | Facility: CLINIC | Age: 64
End: 2024-09-10
Payer: COMMERCIAL

## 2024-09-10 VITALS
DIASTOLIC BLOOD PRESSURE: 78 MMHG | BODY MASS INDEX: 31.13 KG/M2 | SYSTOLIC BLOOD PRESSURE: 138 MMHG | WEIGHT: 229.8 LBS | OXYGEN SATURATION: 95 % | HEIGHT: 72 IN | HEART RATE: 71 BPM

## 2024-09-10 DIAGNOSIS — I10 BENIGN ESSENTIAL HYPERTENSION: ICD-10-CM

## 2024-09-10 DIAGNOSIS — I48.0 PAROXYSMAL ATRIAL FIBRILLATION (MULTI): ICD-10-CM

## 2024-09-10 DIAGNOSIS — E78.49 OTHER HYPERLIPIDEMIA: ICD-10-CM

## 2024-09-10 DIAGNOSIS — I25.10 CORONARY ARTERY DISEASE INVOLVING NATIVE CORONARY ARTERY OF NATIVE HEART WITHOUT ANGINA PECTORIS: Primary | ICD-10-CM

## 2024-09-10 PROCEDURE — 3008F BODY MASS INDEX DOCD: CPT | Performed by: INTERNAL MEDICINE

## 2024-09-10 PROCEDURE — 99214 OFFICE O/P EST MOD 30 MIN: CPT | Performed by: INTERNAL MEDICINE

## 2024-09-10 PROCEDURE — 1123F ACP DISCUSS/DSCN MKR DOCD: CPT | Performed by: INTERNAL MEDICINE

## 2024-09-10 PROCEDURE — 1159F MED LIST DOCD IN RCRD: CPT | Performed by: INTERNAL MEDICINE

## 2024-09-10 PROCEDURE — 1036F TOBACCO NON-USER: CPT | Performed by: INTERNAL MEDICINE

## 2024-09-10 PROCEDURE — 3078F DIAST BP <80 MM HG: CPT | Performed by: INTERNAL MEDICINE

## 2024-09-10 PROCEDURE — 3075F SYST BP GE 130 - 139MM HG: CPT | Performed by: INTERNAL MEDICINE

## 2024-09-10 RX ORDER — PRAVASTATIN SODIUM 40 MG/1
40 TABLET ORAL DAILY
Qty: 30 TABLET | Refills: 11 | Status: SHIPPED | OUTPATIENT
Start: 2024-09-10 | End: 2024-09-12

## 2024-09-10 RX ORDER — LOSARTAN POTASSIUM 50 MG/1
50 TABLET ORAL DAILY
Qty: 90 TABLET | Refills: 3 | Status: SHIPPED | OUTPATIENT
Start: 2024-09-10

## 2024-09-10 RX ORDER — EZETIMIBE 10 MG/1
10 TABLET ORAL DAILY
Qty: 30 TABLET | Refills: 11 | Status: SHIPPED | OUTPATIENT
Start: 2024-09-10 | End: 2024-09-12

## 2024-09-10 RX ORDER — APIXABAN 5 MG/1
5 TABLET, FILM COATED ORAL 2 TIMES DAILY
Qty: 60 TABLET | Refills: 11 | Status: SHIPPED | OUTPATIENT
Start: 2024-09-10 | End: 2025-09-10

## 2024-09-10 RX ORDER — LORATADINE 10 MG/1
10 TABLET ORAL DAILY
COMMUNITY
Start: 2023-12-24

## 2024-09-10 RX ORDER — LOSARTAN POTASSIUM 50 MG/1
50 TABLET ORAL DAILY
Qty: 30 TABLET | Refills: 11 | Status: SHIPPED | OUTPATIENT
Start: 2024-09-10 | End: 2024-09-10

## 2024-09-10 RX ORDER — DICLOFENAC SODIUM 10 MG/G
2 GEL TOPICAL 4 TIMES DAILY
COMMUNITY
Start: 2024-08-15

## 2024-09-10 RX ORDER — ASPIRIN 81 MG/1
81 TABLET ORAL DAILY
Qty: 30 TABLET | Refills: 11 | Status: SHIPPED | OUTPATIENT
Start: 2024-09-10 | End: 2025-09-10

## 2024-09-10 RX ORDER — DILTIAZEM HYDROCHLORIDE 180 MG/1
180 CAPSULE, COATED, EXTENDED RELEASE ORAL DAILY
Qty: 30 CAPSULE | Refills: 11 | Status: SHIPPED | OUTPATIENT
Start: 2024-09-10 | End: 2025-09-10

## 2024-09-10 NOTE — PROGRESS NOTES
[facility] Cardiology Outpatient Follow-up Visit     Reason for Visit: CAD, AF    HPI: Tani Mo is a 63 y.o.  male who presents today for followup.     Patient is a 63-year-old male with a history of dyslipidemia and CAD. He presents for followup for his chronic total occlusion. His symptoms of substernal chest pressure and heaviness primarily brought on by stress have resolved since  PCI. He presented in December 2021 Covid positive in atrial fibrillation with RVR. He had severe symptoms including palpitations, chest discomfort and shortness of breath. He is placed on medical therapy and anticoagulation. He spontaneously converted. He had an admission in April 2023 with atrial fibrillation with RVR. He presents today for follow-up. He denies any chest discomfort. He admits dyspnea on exertion. He denies any lightheadedness, syncope, orthopnea, PND. His myalgias in his thighs have not come back since increasing statin therapy. His fatigue has improved after changing beta-blockade over to Cardizem.     Cardiac catheterization 2018 demonstrated a  of the RCA with mild LAD and circumflex artery disease. This was in response to an abnormal stress test done prior to catheterization. He underwent  PCI right coronary artery July 2018.  11/23/2021 ECG: Normal sinus rhythm, otherwise normal.  12/26/2021 echo: Ejection fraction 55 to 60%, mild LVH.  1/4/2022 ECG: Sinus bradycardia, otherwise normal.  7/3/2022 noninvasive stress test: Normal perfusion, ejection fraction 50%  9/9/2022 echocardiogram: Ejection fraction 60 to 65%, impaired relaxation.  1/5/2023 MACRINA: Negative for arterial occlusive disease.  4/5/23 , , HDL 40, triglycerides 158.     We take care of his mother, Jesús Mo. He works as a  repairing industrial machines.     Past Medical History:   He has a past medical history of Personal history of other medical treatment and Personal history of other medical  treatment.    Surgical History:   He has no past surgical history on file.    Family History:   Family History   Problem Relation Name Age of Onset    Coronary artery disease Mother      Coronary artery disease Father      Other (CABG) Father       Allergies:  Bee venom protein (honey bee)     Social History:      · Caffeine use (V49.89) (Z78.9)   · Does not use illicit drugs (V49.89) (Z78.9)   · Former smoker (V15.82) (Z87.891)   ·    · Moderate alcohol use    Prior Cardiovascular Testing (Personally Reviewed):     Review of Systems:  Review of Systems   All other systems reviewed and are negative.      Outpatient Medications:    Current Outpatient Medications:     aspirin 81 mg EC tablet, Take 1 tablet (81 mg) by mouth once daily., Disp: 90 tablet, Rfl: 3    coenzyme Q-10 200 mg capsule, TAKE AS DIRECTED., Disp: , Rfl:     dilTIAZem CD (Cardizem CD) 180 mg 24 hr capsule, TAKE 1 CAPSULE BY MOUTH EVERY DAY, Disp: 90 capsule, Rfl: 3    Eliquis 5 mg tablet, Take 1 tablet (5 mg) by mouth 2 times a day., Disp: 180 tablet, Rfl: 3    EPINEPHrine (Epipen) 0.3 mg/0.3 mL injection syringe, Inject 0.3 mL (0.3 mg) into the muscle if needed for anaphylaxis. Call 911 after use., Disp: 2 each, Rfl: 1    ezetimibe (Zetia) 10 mg tablet, TAKE 1 TABLET BY MOUTH ONCE DAILY, Disp: 90 tablet, Rfl: 3    losartan (Cozaar) 50 mg tablet, Take 1 tablet (50 mg) by mouth once daily., Disp: 90 tablet, Rfl: 3    multivitamin (MULTIPLE VITAMINS ORAL), Take 1 tablet by mouth once daily., Disp: , Rfl:     pantoprazole (ProtoNix) 40 mg EC tablet, Take 1 tablet (40 mg) by mouth once daily in the morning. Take before meals., Disp: 30 tablet, Rfl: 1    pravastatin (Pravachol) 40 mg tablet, Take 1 tablet (40 mg) by mouth once daily., Disp: 90 tablet, Rfl: 3    ubidecarenone (COENZYME Q10, BULK, MISC), Take by mouth., Disp: , Rfl:      Last Recorded Vitals  There were no vitals taken for this visit.    Physical Exam:    Physical Exam  Vitals  "reviewed.   Constitutional:       Appearance: Normal appearance.   HENT:      Head: Normocephalic and atraumatic.      Mouth/Throat:      Mouth: Mucous membranes are moist.      Pharynx: Oropharynx is clear.   Eyes:      Extraocular Movements: Extraocular movements intact.      Conjunctiva/sclera: Conjunctivae normal.   Cardiovascular:      Rate and Rhythm: Normal rate and regular rhythm.      Pulses: Normal pulses.      Heart sounds: Normal heart sounds.   Pulmonary:      Effort: Pulmonary effort is normal.      Breath sounds: Normal breath sounds.   Abdominal:      General: Bowel sounds are normal.      Palpations: Abdomen is soft.   Musculoskeletal:         General: No swelling.      Cervical back: Neck supple.   Skin:     General: Skin is warm and dry.   Neurological:      General: No focal deficit present.      Mental Status: He is alert.   Psychiatric:         Mood and Affect: Mood normal.         Behavior: Behavior normal.         Lab/Radiology/Diagnostic Review:    Labs    Lab Results   Component Value Date    GLUCOSE 100 (H) 04/05/2023    CALCIUM 9.5 04/05/2023     04/05/2023    K 4.3 04/05/2023    CO2 26 04/05/2023     04/05/2023    BUN 15 04/05/2023    CREATININE 1.11 04/05/2023       Lab Results   Component Value Date    WBC 8.1 04/05/2023    HGB 15.8 04/05/2023    HCT 48.0 04/05/2023    MCV 92 04/05/2023     04/05/2023       Lab Results   Component Value Date    CHOL 180 04/05/2023     Lab Results   Component Value Date    HDL 40.3 04/05/2023     No results found for: \"LDLCALC\"  Lab Results   Component Value Date    TRIG 158 (H) 04/05/2023     No components found for: \"CHOLHDL\"    No results found for: \"BNP\"    No results found for: \"TSH\"    Assessment:   Patient's heart rate and blood pressure well controlled.     He underwent successful revascularization of his  of the RCA with RAYSA. He'll remain on aspirin therapy given the need for anticoagulation.     Patient's noninvasive " stress test and echo are unremarkable.      He is post cryoablation for PAF. He had an episode of atrial fibrillation in April 2023. Continue Cardizem extended release 180 mg daily. This can be titrated up as needed.     Patient is unwilling to take a PCSK9 inhibitor. Higher doses of statin therapy in the past were unsuccessful secondary to significant myalgias.  He is least willing to think about Leqvio if his LDL is greater than 70.     We'll see him back in 12 months or sooner if he has more problems.     Tramaine Jones MD

## 2024-09-10 NOTE — LETTER
September 10, 2024     Franklyn Velasquez DO  [address]  [facility] [address]    Patient: Tani Mo   YOB: 1960   Date of Visit: 9/10/2024       Dear Dr. Franklyn Velasquez, :    Thank you for referring Tani Mo to me for evaluation. Below are my notes for this consultation.  If you have questions, please do not hesitate to call me. I look forward to following your patient along with you.       Sincerely,     Tramaine Jones MD      CC: No Recipients  ______________________________________________________________________________________        [facility] Cardiology Outpatient Follow-up Visit     Reason for Visit: CAD, AF    HPI: aTni Mo is a 63 y.o.  male who presents today for followup.     Patient is a 63-year-old male with a history of dyslipidemia and CAD. He presents for followup for his chronic total occlusion. His symptoms of substernal chest pressure and heaviness primarily brought on by stress have resolved since  PCI. He presented in December 2021 Covid positive in atrial fibrillation with RVR. He had severe symptoms including palpitations, chest discomfort and shortness of breath. He is placed on medical therapy and anticoagulation. He spontaneously converted. He had an admission in April 2023 with atrial fibrillation with RVR. He presents today for follow-up. He denies any chest discomfort. He admits dyspnea on exertion. He denies any lightheadedness, syncope, orthopnea, PND. His myalgias in his thighs have not come back since increasing statin therapy. His fatigue has improved after changing beta-blockade over to Cardizem.     Cardiac catheterization 2018 demonstrated a  of the RCA with mild LAD and circumflex artery disease. This was in response to an abnormal stress test done prior to catheterization. He underwent  PCI right coronary artery July 2018.  11/23/2021 ECG: Normal sinus rhythm, otherwise normal.  12/26/2021 echo: Ejection  fraction 55 to 60%, mild LVH.  1/4/2022 ECG: Sinus bradycardia, otherwise normal.  7/3/2022 noninvasive stress test: Normal perfusion, ejection fraction 50%  9/9/2022 echocardiogram: Ejection fraction 60 to 65%, impaired relaxation.  1/5/2023 MACRINA: Negative for arterial occlusive disease.  4/5/23 , , HDL 40, triglycerides 158.     We take care of his mother, Jesús Mo. He works as a  repairing industrial machines.     Past Medical History:   He has a past medical history of Personal history of other medical treatment and Personal history of other medical treatment.    Surgical History:   He has no past surgical history on file.    Family History:   Family History   Problem Relation Name Age of Onset   • Coronary artery disease Mother     • Coronary artery disease Father     • Other (CABG) Father       Allergies:  Bee venom protein (honey bee)     Social History:      · Caffeine use (V49.89) (Z78.9)   · Does not use illicit drugs (V49.89) (Z78.9)   · Former smoker (V15.82) (Z87.891)   ·    · Moderate alcohol use    Prior Cardiovascular Testing (Personally Reviewed):     Review of Systems:  Review of Systems   All other systems reviewed and are negative.      Outpatient Medications:    Current Outpatient Medications:   •  aspirin 81 mg EC tablet, Take 1 tablet (81 mg) by mouth once daily., Disp: 90 tablet, Rfl: 3  •  coenzyme Q-10 200 mg capsule, TAKE AS DIRECTED., Disp: , Rfl:   •  dilTIAZem CD (Cardizem CD) 180 mg 24 hr capsule, TAKE 1 CAPSULE BY MOUTH EVERY DAY, Disp: 90 capsule, Rfl: 3  •  Eliquis 5 mg tablet, Take 1 tablet (5 mg) by mouth 2 times a day., Disp: 180 tablet, Rfl: 3  •  EPINEPHrine (Epipen) 0.3 mg/0.3 mL injection syringe, Inject 0.3 mL (0.3 mg) into the muscle if needed for anaphylaxis. Call 911 after use., Disp: 2 each, Rfl: 1  •  ezetimibe (Zetia) 10 mg tablet, TAKE 1 TABLET BY MOUTH ONCE DAILY, Disp: 90 tablet, Rfl: 3  •  losartan (Cozaar) 50 mg tablet, Take 1  tablet (50 mg) by mouth once daily., Disp: 90 tablet, Rfl: 3  •  multivitamin (MULTIPLE VITAMINS ORAL), Take 1 tablet by mouth once daily., Disp: , Rfl:   •  pantoprazole (ProtoNix) 40 mg EC tablet, Take 1 tablet (40 mg) by mouth once daily in the morning. Take before meals., Disp: 30 tablet, Rfl: 1  •  pravastatin (Pravachol) 40 mg tablet, Take 1 tablet (40 mg) by mouth once daily., Disp: 90 tablet, Rfl: 3  •  ubidecarenone (COENZYME Q10, BULK, MISC), Take by mouth., Disp: , Rfl:      Last Recorded Vitals  There were no vitals taken for this visit.    Physical Exam:    Physical Exam  Vitals reviewed.   Constitutional:       Appearance: Normal appearance.   HENT:      Head: Normocephalic and atraumatic.      Mouth/Throat:      Mouth: Mucous membranes are moist.      Pharynx: Oropharynx is clear.   Eyes:      Extraocular Movements: Extraocular movements intact.      Conjunctiva/sclera: Conjunctivae normal.   Cardiovascular:      Rate and Rhythm: Normal rate and regular rhythm.      Pulses: Normal pulses.      Heart sounds: Normal heart sounds.   Pulmonary:      Effort: Pulmonary effort is normal.      Breath sounds: Normal breath sounds.   Abdominal:      General: Bowel sounds are normal.      Palpations: Abdomen is soft.   Musculoskeletal:         General: No swelling.      Cervical back: Neck supple.   Skin:     General: Skin is warm and dry.   Neurological:      General: No focal deficit present.      Mental Status: He is alert.   Psychiatric:         Mood and Affect: Mood normal.         Behavior: Behavior normal.         Lab/Radiology/Diagnostic Review:    Labs    Lab Results   Component Value Date    GLUCOSE 100 (H) 04/05/2023    CALCIUM 9.5 04/05/2023     04/05/2023    K 4.3 04/05/2023    CO2 26 04/05/2023     04/05/2023    BUN 15 04/05/2023    CREATININE 1.11 04/05/2023       Lab Results   Component Value Date    WBC 8.1 04/05/2023    HGB 15.8 04/05/2023    HCT 48.0 04/05/2023    MCV 92 04/05/2023  "    04/05/2023       Lab Results   Component Value Date    CHOL 180 04/05/2023     Lab Results   Component Value Date    HDL 40.3 04/05/2023     No results found for: \"LDLCALC\"  Lab Results   Component Value Date    TRIG 158 (H) 04/05/2023     No components found for: \"CHOLHDL\"    No results found for: \"BNP\"    No results found for: \"TSH\"    Assessment:   Patient's heart rate and blood pressure well controlled.     He underwent successful revascularization of his  of the RCA with RAYSA. He'll remain on aspirin therapy given the need for anticoagulation.     Patient's noninvasive stress test and echo are unremarkable.      He is post cryoablation for PAF. He had an episode of atrial fibrillation in April 2023. Continue Cardizem extended release 180 mg daily. This can be titrated up as needed.     Patient is unwilling to take a PCSK9 inhibitor. Higher doses of statin therapy in the past were unsuccessful secondary to significant myalgias.  He is least willing to think about Leqvio if his LDL is greater than 70.     We'll see him back in 12 months or sooner if he has more problems.     Tramaine Jones MD      "

## 2024-09-10 NOTE — LETTER
September 10, 2024     No Recipients    Patient: Tani Mo   YOB: 1960   Date of Visit: 9/10/2024       Dear Dr. Anderson Recipients:    Thank you for referring Tani Mo to me for evaluation. Below are my notes for this consultation.  If you have questions, please do not hesitate to call me. I look forward to following your patient along with you.       Sincerely,     Tramaine Jones MD      CC: No Recipients  ______________________________________________________________________________________        [facility] Cardiology Outpatient Follow-up Visit     Reason for Visit: CAD, AF    HPI: Tani Mo is a 63 y.o.  male who presents today for followup.     Patient is a 63-year-old male with a history of dyslipidemia and CAD. He presents for followup for his chronic total occlusion. His symptoms of substernal chest pressure and heaviness primarily brought on by stress have resolved since  PCI. He presented in December 2021 Covid positive in atrial fibrillation with RVR. He had severe symptoms including palpitations, chest discomfort and shortness of breath. He is placed on medical therapy and anticoagulation. He spontaneously converted. He had an admission in April 2023 with atrial fibrillation with RVR. He presents today for follow-up. He denies any chest discomfort. He admits dyspnea on exertion. He denies any lightheadedness, syncope, orthopnea, PND. His myalgias in his thighs have not come back since increasing statin therapy. His fatigue has improved after changing beta-blockade over to Cardizem.     Cardiac catheterization 2018 demonstrated a  of the RCA with mild LAD and circumflex artery disease. This was in response to an abnormal stress test done prior to catheterization. He underwent  PCI right coronary artery July 2018.  11/23/2021 ECG: Normal sinus rhythm, otherwise normal.  12/26/2021 echo: Ejection fraction 55 to 60%, mild LVH.  1/4/2022 ECG: Sinus bradycardia,  otherwise normal.  7/3/2022 noninvasive stress test: Normal perfusion, ejection fraction 50%  9/9/2022 echocardiogram: Ejection fraction 60 to 65%, impaired relaxation.  1/5/2023 MACRINA: Negative for arterial occlusive disease.  4/5/23 , , HDL 40, triglycerides 158.     We take care of his mother, Jesús Mo. He works as a  repairing industrial machines.     Past Medical History:   He has a past medical history of Personal history of other medical treatment and Personal history of other medical treatment.    Surgical History:   He has no past surgical history on file.    Family History:   Family History   Problem Relation Name Age of Onset   • Coronary artery disease Mother     • Coronary artery disease Father     • Other (CABG) Father       Allergies:  Bee venom protein (honey bee)     Social History:      · Caffeine use (V49.89) (Z78.9)   · Does not use illicit drugs (V49.89) (Z78.9)   · Former smoker (V15.82) (Z87.891)   ·    · Moderate alcohol use    Prior Cardiovascular Testing (Personally Reviewed):     Review of Systems:  Review of Systems   All other systems reviewed and are negative.      Outpatient Medications:    Current Outpatient Medications:   •  aspirin 81 mg EC tablet, Take 1 tablet (81 mg) by mouth once daily., Disp: 90 tablet, Rfl: 3  •  coenzyme Q-10 200 mg capsule, TAKE AS DIRECTED., Disp: , Rfl:   •  dilTIAZem CD (Cardizem CD) 180 mg 24 hr capsule, TAKE 1 CAPSULE BY MOUTH EVERY DAY, Disp: 90 capsule, Rfl: 3  •  Eliquis 5 mg tablet, Take 1 tablet (5 mg) by mouth 2 times a day., Disp: 180 tablet, Rfl: 3  •  EPINEPHrine (Epipen) 0.3 mg/0.3 mL injection syringe, Inject 0.3 mL (0.3 mg) into the muscle if needed for anaphylaxis. Call 911 after use., Disp: 2 each, Rfl: 1  •  ezetimibe (Zetia) 10 mg tablet, TAKE 1 TABLET BY MOUTH ONCE DAILY, Disp: 90 tablet, Rfl: 3  •  losartan (Cozaar) 50 mg tablet, Take 1 tablet (50 mg) by mouth once daily., Disp: 90 tablet, Rfl: 3  •   multivitamin (MULTIPLE VITAMINS ORAL), Take 1 tablet by mouth once daily., Disp: , Rfl:   •  pantoprazole (ProtoNix) 40 mg EC tablet, Take 1 tablet (40 mg) by mouth once daily in the morning. Take before meals., Disp: 30 tablet, Rfl: 1  •  pravastatin (Pravachol) 40 mg tablet, Take 1 tablet (40 mg) by mouth once daily., Disp: 90 tablet, Rfl: 3  •  ubidecarenone (COENZYME Q10, BULK, MISC), Take by mouth., Disp: , Rfl:      Last Recorded Vitals  There were no vitals taken for this visit.    Physical Exam:    Physical Exam  Vitals reviewed.   Constitutional:       Appearance: Normal appearance.   HENT:      Head: Normocephalic and atraumatic.      Mouth/Throat:      Mouth: Mucous membranes are moist.      Pharynx: Oropharynx is clear.   Eyes:      Extraocular Movements: Extraocular movements intact.      Conjunctiva/sclera: Conjunctivae normal.   Cardiovascular:      Rate and Rhythm: Normal rate and regular rhythm.      Pulses: Normal pulses.      Heart sounds: Normal heart sounds.   Pulmonary:      Effort: Pulmonary effort is normal.      Breath sounds: Normal breath sounds.   Abdominal:      General: Bowel sounds are normal.      Palpations: Abdomen is soft.   Musculoskeletal:         General: No swelling.      Cervical back: Neck supple.   Skin:     General: Skin is warm and dry.   Neurological:      General: No focal deficit present.      Mental Status: He is alert.   Psychiatric:         Mood and Affect: Mood normal.         Behavior: Behavior normal.         Lab/Radiology/Diagnostic Review:    Labs    Lab Results   Component Value Date    GLUCOSE 100 (H) 04/05/2023    CALCIUM 9.5 04/05/2023     04/05/2023    K 4.3 04/05/2023    CO2 26 04/05/2023     04/05/2023    BUN 15 04/05/2023    CREATININE 1.11 04/05/2023       Lab Results   Component Value Date    WBC 8.1 04/05/2023    HGB 15.8 04/05/2023    HCT 48.0 04/05/2023    MCV 92 04/05/2023     04/05/2023       Lab Results   Component Value Date  "   CHOL 180 04/05/2023     Lab Results   Component Value Date    HDL 40.3 04/05/2023     No results found for: \"LDLCALC\"  Lab Results   Component Value Date    TRIG 158 (H) 04/05/2023     No components found for: \"CHOLHDL\"    No results found for: \"BNP\"    No results found for: \"TSH\"    Assessment:   Patient's heart rate and blood pressure well controlled.     He underwent successful revascularization of his  of the RCA with RAYSA. He'll remain on aspirin therapy given the need for anticoagulation.     Patient's noninvasive stress test and echo are unremarkable.      He is post cryoablation for PAF. He had an episode of atrial fibrillation in April 2023. Continue Cardizem extended release 180 mg daily. This can be titrated up as needed.     Patient is unwilling to take a PCSK9 inhibitor. Higher doses of statin therapy in the past were unsuccessful secondary to significant myalgias.  He is least willing to think about Leqvio if his LDL is greater than 70.     We'll see him back in 12 months or sooner if he has more problems.     Tramaine Jones MD    "

## 2024-09-12 DIAGNOSIS — E78.49 OTHER HYPERLIPIDEMIA: ICD-10-CM

## 2024-09-12 RX ORDER — PRAVASTATIN SODIUM 40 MG/1
40 TABLET ORAL DAILY
Qty: 90 TABLET | Refills: 3 | Status: SHIPPED | OUTPATIENT
Start: 2024-09-12

## 2024-09-12 RX ORDER — EZETIMIBE 10 MG/1
10 TABLET ORAL DAILY
Qty: 90 TABLET | Refills: 3 | Status: SHIPPED | OUTPATIENT
Start: 2024-09-12

## 2024-09-20 ENCOUNTER — APPOINTMENT (OUTPATIENT)
Dept: CARDIOLOGY | Facility: CLINIC | Age: 64
End: 2024-09-20
Payer: COMMERCIAL

## 2024-10-12 LAB
NON-UH HIE A/G RATIO: 1.2
NON-UH HIE ALB: 3.7 G/DL (ref 3.4–5)
NON-UH HIE ALK PHOS: 71 UNIT/L (ref 45–117)
NON-UH HIE BILIRUBIN, TOTAL: 0.8 MG/DL (ref 0.3–1.2)
NON-UH HIE BUN/CREAT RATIO: 12.7
NON-UH HIE BUN: 14 MG/DL (ref 9–23)
NON-UH HIE CALCIUM: 9 MG/DL (ref 8.7–10.4)
NON-UH HIE CALCULATED LDL CHOLESTEROL: 81 MG/DL (ref 60–130)
NON-UH HIE CALCULATED OSMOLALITY: 288 MOSM/KG (ref 275–295)
NON-UH HIE CHLORIDE: 110 MMOL/L (ref 98–107)
NON-UH HIE CHOLESTEROL: 136 MG/DL (ref 100–200)
NON-UH HIE CO2, VENOUS: 27 MMOL/L (ref 20–31)
NON-UH HIE CREATININE: 1.1 MG/DL (ref 0.6–1.1)
NON-UH HIE GFR AA: >60
NON-UH HIE GLOBULIN: 3 G/DL
NON-UH HIE GLOMERULAR FILTRATION RATE: >60 ML/MIN/1.73M?
NON-UH HIE GLUCOSE: 103 MG/DL (ref 74–106)
NON-UH HIE GOT: 32 UNIT/L (ref 15–37)
NON-UH HIE GPT: 38 UNIT/L (ref 10–49)
NON-UH HIE HDL CHOLESTEROL: 38 MG/DL (ref 40–60)
NON-UH HIE K: 4.2 MMOL/L (ref 3.5–5.1)
NON-UH HIE NA: 144 MMOL/L (ref 135–145)
NON-UH HIE TOTAL CHOL/HDL CHOL RATIO: 3.6
NON-UH HIE TOTAL PROTEIN: 6.7 G/DL (ref 5.7–8.2)
NON-UH HIE TRIGLYCERIDES: 83 MG/DL (ref 30–150)

## 2024-10-29 ENCOUNTER — APPOINTMENT (OUTPATIENT)
Dept: PRIMARY CARE | Facility: CLINIC | Age: 64
End: 2024-10-29
Payer: COMMERCIAL

## 2024-10-29 VITALS
WEIGHT: 229 LBS | DIASTOLIC BLOOD PRESSURE: 78 MMHG | BODY MASS INDEX: 31.02 KG/M2 | OXYGEN SATURATION: 92 % | SYSTOLIC BLOOD PRESSURE: 122 MMHG | HEIGHT: 72 IN | HEART RATE: 66 BPM

## 2024-10-29 DIAGNOSIS — I10 BENIGN ESSENTIAL HYPERTENSION: ICD-10-CM

## 2024-10-29 DIAGNOSIS — E78.49 OTHER HYPERLIPIDEMIA: ICD-10-CM

## 2024-10-29 DIAGNOSIS — I48.0 PAROXYSMAL ATRIAL FIBRILLATION (MULTI): ICD-10-CM

## 2024-10-29 DIAGNOSIS — K21.9 GASTROESOPHAGEAL REFLUX DISEASE WITHOUT ESOPHAGITIS: ICD-10-CM

## 2024-10-29 PROCEDURE — 3074F SYST BP LT 130 MM HG: CPT | Performed by: FAMILY MEDICINE

## 2024-10-29 PROCEDURE — 3078F DIAST BP <80 MM HG: CPT | Performed by: FAMILY MEDICINE

## 2024-10-29 PROCEDURE — 1158F ADVNC CARE PLAN TLK DOCD: CPT | Performed by: FAMILY MEDICINE

## 2024-10-29 PROCEDURE — 99214 OFFICE O/P EST MOD 30 MIN: CPT | Performed by: FAMILY MEDICINE

## 2024-10-29 PROCEDURE — 3008F BODY MASS INDEX DOCD: CPT | Performed by: FAMILY MEDICINE

## 2024-10-29 PROCEDURE — 1123F ACP DISCUSS/DSCN MKR DOCD: CPT | Performed by: FAMILY MEDICINE

## 2024-10-29 RX ORDER — LOSARTAN POTASSIUM 50 MG/1
50 TABLET ORAL DAILY
Qty: 90 TABLET | Refills: 3 | Status: SHIPPED | OUTPATIENT
Start: 2024-10-29

## 2024-10-29 RX ORDER — PRAVASTATIN SODIUM 40 MG/1
40 TABLET ORAL DAILY
Qty: 90 TABLET | Refills: 3 | Status: SHIPPED | OUTPATIENT
Start: 2024-10-29

## 2024-10-29 RX ORDER — DILTIAZEM HYDROCHLORIDE 180 MG/1
180 CAPSULE, COATED, EXTENDED RELEASE ORAL DAILY
Qty: 30 CAPSULE | Refills: 11 | Status: SHIPPED | OUTPATIENT
Start: 2024-10-29 | End: 2025-10-29

## 2024-10-29 RX ORDER — PANTOPRAZOLE SODIUM 40 MG/1
40 TABLET, DELAYED RELEASE ORAL
Qty: 90 TABLET | Refills: 1 | Status: SHIPPED | OUTPATIENT
Start: 2024-10-29

## 2024-10-29 ASSESSMENT — ENCOUNTER SYMPTOMS
CONSTITUTIONAL NEGATIVE: 1
CARDIOVASCULAR NEGATIVE: 1
NEUROLOGICAL NEGATIVE: 1
RESPIRATORY NEGATIVE: 1
MUSCULOSKELETAL NEGATIVE: 1

## 2024-11-04 DIAGNOSIS — I48.0 PAROXYSMAL ATRIAL FIBRILLATION (MULTI): Primary | ICD-10-CM

## 2024-11-27 DIAGNOSIS — T78.40XS ALLERGIC REACTION, SEQUELA: ICD-10-CM

## 2024-11-29 RX ORDER — EPINEPHRINE 0.3 MG/.3ML
1 INJECTION SUBCUTANEOUS AS NEEDED
Qty: 2 EACH | Refills: 1 | Status: SHIPPED | OUTPATIENT
Start: 2024-11-29 | End: 2025-11-29

## 2024-12-02 ENCOUNTER — APPOINTMENT (OUTPATIENT)
Dept: CARDIOLOGY | Facility: CLINIC | Age: 64
End: 2024-12-02
Payer: COMMERCIAL

## 2024-12-10 PROBLEM — Z79.899 HIGH RISK MEDICATION USE: Status: ACTIVE | Noted: 2024-12-10

## 2024-12-10 PROBLEM — I10 BENIGN ESSENTIAL HYPERTENSION: Chronic | Status: ACTIVE | Noted: 2023-10-27

## 2024-12-10 PROBLEM — I48.0 PAROXYSMAL ATRIAL FIBRILLATION (MULTI): Chronic | Status: ACTIVE | Noted: 2023-10-27

## 2024-12-11 ENCOUNTER — APPOINTMENT (OUTPATIENT)
Dept: CARDIOLOGY | Facility: CLINIC | Age: 64
End: 2024-12-11
Payer: COMMERCIAL

## 2024-12-11 VITALS
HEIGHT: 72 IN | WEIGHT: 230 LBS | OXYGEN SATURATION: 95 % | DIASTOLIC BLOOD PRESSURE: 80 MMHG | BODY MASS INDEX: 31.15 KG/M2 | HEART RATE: 75 BPM | SYSTOLIC BLOOD PRESSURE: 130 MMHG

## 2024-12-11 DIAGNOSIS — I48.0 PAROXYSMAL ATRIAL FIBRILLATION (MULTI): ICD-10-CM

## 2024-12-11 DIAGNOSIS — I10 BENIGN ESSENTIAL HYPERTENSION: ICD-10-CM

## 2024-12-11 DIAGNOSIS — E78.2 MIXED HYPERLIPIDEMIA: ICD-10-CM

## 2024-12-11 DIAGNOSIS — Z79.899 HIGH RISK MEDICATION USE: Primary | ICD-10-CM

## 2024-12-11 PROBLEM — E78.5 HYPERLIPIDEMIA: Chronic | Status: ACTIVE | Noted: 2023-10-27

## 2024-12-11 PROCEDURE — 3079F DIAST BP 80-89 MM HG: CPT | Performed by: INTERNAL MEDICINE

## 2024-12-11 PROCEDURE — 3075F SYST BP GE 130 - 139MM HG: CPT | Performed by: INTERNAL MEDICINE

## 2024-12-11 PROCEDURE — 1159F MED LIST DOCD IN RCRD: CPT | Performed by: INTERNAL MEDICINE

## 2024-12-11 PROCEDURE — 99213 OFFICE O/P EST LOW 20 MIN: CPT | Performed by: INTERNAL MEDICINE

## 2024-12-11 PROCEDURE — 3008F BODY MASS INDEX DOCD: CPT | Performed by: INTERNAL MEDICINE

## 2024-12-11 PROCEDURE — 1123F ACP DISCUSS/DSCN MKR DOCD: CPT | Performed by: INTERNAL MEDICINE

## 2024-12-11 PROCEDURE — 1036F TOBACCO NON-USER: CPT | Performed by: INTERNAL MEDICINE

## 2024-12-11 NOTE — PROGRESS NOTES
CARDIAC ELECTROPHYSIOLOGY PROGRESS NOTE    History Of Present Illness:      This is a 64-year-old male with a history of atrial fibrillation.  He has no complaints of palpitations, chest pain, or shortness of breath.  No recent episodes of atrial fibrillation.  Pulmonary vein isolation was performed November 17, 2022.  He has myalgias secondary to statins.     Past medical history:     Paroxysmal atrial fibrillation: History of frequent episodes of atrial fibrillation, at least 1 episode per month with episodes lasting 12 to 24 hours.  Pulmonary vein isolation performed November 2022.  Coronary artery disease with history of  PCI of the RCA in July 2018  Hypertension  Hyperlipidemia    Review of Systems  Other review of systems negative     Last Recorded Vitals:      6/9/2023     2:41 PM 9/29/2023     2:49 PM 10/30/2023     3:36 PM 12/18/2023     3:16 PM 4/29/2024     3:41 PM 9/10/2024     1:50 PM 10/29/2024     3:06 PM   Vitals   Systolic 120 126 162 160 118 138 122   Diastolic 68 84 90 84 74 78 78   BP Location    Right arm  Left arm    Heart Rate 77 66 64 75 69 71 66   Height 1.829 m (6') 1.829 m (6') 1.829 m (6') 1.829 m (6') 1.829 m (6') 1.829 m (6') 1.829 m (6')   Weight (lb) 228.38 230 235 235 227 229.8 229   BMI 30.97 kg/m2 31.19 kg/m2 31.87 kg/m2 31.87 kg/m2 30.79 kg/m2 31.17 kg/m2 31.06 kg/m2   BSA (m2) 2.3 m2 2.3 m2 2.33 m2 2.33 m2 2.29 m2 2.3 m2 2.3 m2   Visit Report   Report Report Report Report Report     Allergies:  Bee venom protein (honey bee)    Outpatient Medications:  Current Outpatient Medications   Medication Instructions    aspirin 81 mg, oral, Daily    coenzyme Q-10 200 mg capsule TAKE AS DIRECTED.      dilTIAZem CD (CARDIZEM CD) 180 mg, oral, Daily    Eliquis 5 mg, oral, 2 times daily    EPINEPHrine (EPIPEN) 0.3 mg, intramuscular, As needed, Call 911 after use.    ezetimibe (ZETIA) 10 mg, oral, Daily    losartan (COZAAR) 50 mg, oral, Daily    multivitamin (MULTIPLE VITAMINS ORAL) 1  tablet, oral, Daily    pantoprazole (PROTONIX) 40 mg, oral, Daily before breakfast    pravastatin (PRAVACHOL) 40 mg, oral, Daily       Physical Exam:    General Appearance:  Alert, oriented, no distress  Skin:  Warm and dry  Head and Neck:  No elevation of JVP, no carotid bruits  Cardiac Exam:  Rhythm is regular, S1 and S2 are normal, no murmur S3 or S4  Lungs:  Clear to auscultation  Extremities:  no edema  Neurologic:  No focal deficits  Psychiatric:  Appropriate mood and behavior    Lab Results:    CMP:  Recent Labs     04/05/23  1143 11/18/22  0531 11/17/22  1102    140 141   K 4.3 4.1 4.3    108* 107   CO2 26 26 28   ANIONGAP 14 10 10   BUN 15 14 13   CREATININE 1.11 0.97 1.12     Recent Labs     04/05/23  1143   ALBUMIN 4.2   ALKPHOS 66   ALT 28   AST 23   BILITOT 1.5*     CBC:  Recent Labs     04/05/23  1143 11/18/22  0531 11/17/22  1102   WBC 8.1 13.1* 6.3   HGB 15.8 13.3* 14.9   HCT 48.0 39.8* 44.6    152 170   MCV 92 92 92     COAG:   Recent Labs     11/17/22  1102   INR 1.1     Cardiology Tests:  I have personally review the diagnostic cardiac testing and my interpretation is as follows:    EKG December 18, 2023: Sinus rhythm, no ischemic changes  Echocardiogram September 2022: Normal left ventricular function.    Assessment/Plan   Problem List Items Addressed This Visit             ICD-10-CM    Benign essential hypertension (Chronic) I10    Hyperlipidemia (Chronic) E78.5     The patient is having difficulties tolerating statins.  General cardiology follow-up will be arranged to consider a PCSK9 inhibitor.         Paroxysmal atrial fibrillation (Multi) (Chronic) I48.0     Tani has a history of symptomatic atrial fibrillation and underwent a cryo PVI in November 2022.  He had 1 episode of atrial fibrillation in April 2023 but has remained in sinus rhythm since then.  Continue same medication regimen.         High risk medication use - Primary Z79.899     No bleeding problems on Eliquis  5 mg twice daily.          James C Ramicone, DO

## 2024-12-11 NOTE — ASSESSMENT & PLAN NOTE
Tani has a history of symptomatic atrial fibrillation and underwent a cryo PVI in November 2022.  He had 1 episode of atrial fibrillation in April 2023 but has remained in sinus rhythm since then.  Continue same medication regimen.

## 2024-12-11 NOTE — ASSESSMENT & PLAN NOTE
The patient is having difficulties tolerating statins.  General cardiology follow-up will be arranged to consider a PCSK9 inhibitor.

## 2024-12-29 DIAGNOSIS — E78.49 OTHER HYPERLIPIDEMIA: ICD-10-CM

## 2025-01-07 RX ORDER — EZETIMIBE 10 MG/1
10 TABLET ORAL DAILY
Qty: 210 TABLET | Refills: 0 | Status: SHIPPED | OUTPATIENT
Start: 2025-01-07 | End: 2025-01-08 | Stop reason: SDUPTHER

## 2025-01-08 ENCOUNTER — TELEPHONE (OUTPATIENT)
Dept: CARDIOLOGY | Facility: CLINIC | Age: 65
End: 2025-01-08
Payer: COMMERCIAL

## 2025-01-08 DIAGNOSIS — E78.49 OTHER HYPERLIPIDEMIA: ICD-10-CM

## 2025-01-08 RX ORDER — EZETIMIBE 10 MG/1
10 TABLET ORAL DAILY
Qty: 90 TABLET | Refills: 3 | OUTPATIENT
Start: 2025-01-08 | End: 2025-01-08 | Stop reason: SDUPTHER

## 2025-01-08 RX ORDER — EZETIMIBE 10 MG/1
10 TABLET ORAL DAILY
Qty: 90 TABLET | Refills: 3 | Status: SHIPPED | OUTPATIENT
Start: 2025-01-08

## 2025-01-08 NOTE — TELEPHONE ENCOUNTER
Wife states rx for ezetimibe 10mg not at pharmacy. Requesting refills.    Spoke with  pharmacy and confirmed they did not receive refill yesterday. Authorization for refills provided.    Patient aware.

## 2025-02-04 DIAGNOSIS — I48.0 PAROXYSMAL ATRIAL FIBRILLATION (MULTI): ICD-10-CM

## 2025-02-04 RX ORDER — APIXABAN 5 MG/1
5 TABLET, FILM COATED ORAL 2 TIMES DAILY
Qty: 180 TABLET | Refills: 3 | Status: SHIPPED | OUTPATIENT
Start: 2025-02-04

## 2025-02-10 DIAGNOSIS — E78.49 OTHER HYPERLIPIDEMIA: ICD-10-CM

## 2025-02-10 RX ORDER — PRAVASTATIN SODIUM 40 MG/1
TABLET ORAL
Qty: 90 TABLET | Refills: 3 | Status: SHIPPED | OUTPATIENT
Start: 2025-02-10

## 2025-02-24 ENCOUNTER — APPOINTMENT (OUTPATIENT)
Dept: CARDIOLOGY | Facility: CLINIC | Age: 65
End: 2025-02-24
Payer: COMMERCIAL

## 2025-03-18 ENCOUNTER — APPOINTMENT (OUTPATIENT)
Dept: CARDIOLOGY | Facility: CLINIC | Age: 65
End: 2025-03-18
Payer: COMMERCIAL

## 2025-05-04 DIAGNOSIS — K21.9 GASTROESOPHAGEAL REFLUX DISEASE WITHOUT ESOPHAGITIS: ICD-10-CM

## 2025-05-05 RX ORDER — PANTOPRAZOLE SODIUM 40 MG/1
40 TABLET, DELAYED RELEASE ORAL
Qty: 90 TABLET | Refills: 0 | Status: SHIPPED | OUTPATIENT
Start: 2025-05-05

## 2025-05-12 ENCOUNTER — APPOINTMENT (OUTPATIENT)
Dept: PRIMARY CARE | Facility: CLINIC | Age: 65
End: 2025-05-12
Payer: COMMERCIAL

## 2025-05-14 PROBLEM — E66.811 CLASS 1 OBESITY WITH BODY MASS INDEX (BMI) OF 31.0 TO 31.9 IN ADULT: Status: ACTIVE | Noted: 2025-05-14

## 2025-05-15 ENCOUNTER — APPOINTMENT (OUTPATIENT)
Dept: CARDIOLOGY | Facility: CLINIC | Age: 65
End: 2025-05-15
Payer: COMMERCIAL

## 2025-05-15 VITALS
SYSTOLIC BLOOD PRESSURE: 155 MMHG | DIASTOLIC BLOOD PRESSURE: 88 MMHG | OXYGEN SATURATION: 96 % | HEART RATE: 61 BPM | BODY MASS INDEX: 31.19 KG/M2 | WEIGHT: 230 LBS

## 2025-05-15 DIAGNOSIS — I48.0 PAROXYSMAL ATRIAL FIBRILLATION (MULTI): Primary | ICD-10-CM

## 2025-05-15 DIAGNOSIS — I25.10 CORONARY ARTERY DISEASE INVOLVING NATIVE HEART WITHOUT ANGINA PECTORIS, UNSPECIFIED VESSEL OR LESION TYPE: ICD-10-CM

## 2025-05-15 DIAGNOSIS — R06.02 SHORTNESS OF BREATH: ICD-10-CM

## 2025-05-15 DIAGNOSIS — E78.2 MIXED HYPERLIPIDEMIA: Chronic | ICD-10-CM

## 2025-05-15 DIAGNOSIS — I10 BENIGN ESSENTIAL HYPERTENSION: Chronic | ICD-10-CM

## 2025-05-15 LAB
NON-UH HIE A/G RATIO: 1.3
NON-UH HIE ALB: 4.1 G/DL (ref 3.4–5)
NON-UH HIE ALK PHOS: 71 UNIT/L (ref 45–117)
NON-UH HIE BILIRUBIN, TOTAL: 1.5 MG/DL (ref 0.3–1.2)
NON-UH HIE BUN/CREAT RATIO: 10
NON-UH HIE BUN: 12 MG/DL (ref 9–23)
NON-UH HIE CALCIUM: 9.6 MG/DL (ref 8.7–10.4)
NON-UH HIE CALCULATED LDL CHOLESTEROL: 76 MG/DL (ref 60–130)
NON-UH HIE CALCULATED OSMOLALITY: 287 MOSM/KG (ref 275–295)
NON-UH HIE CHLORIDE: 106 MMOL/L (ref 98–107)
NON-UH HIE CHOLESTEROL: 134 MG/DL (ref 100–200)
NON-UH HIE CO2, VENOUS: 29 MMOL/L (ref 20–31)
NON-UH HIE CREATININE: 1.2 MG/DL (ref 0.6–1.1)
NON-UH HIE GFR AA: >60
NON-UH HIE GLOBULIN: 3.2 G/DL
NON-UH HIE GLOMERULAR FILTRATION RATE: >60 ML/MIN/1.73M?
NON-UH HIE GLUCOSE: 96 MG/DL (ref 74–106)
NON-UH HIE GOT: 21 UNIT/L (ref 15–37)
NON-UH HIE GPT: 27 UNIT/L (ref 10–49)
NON-UH HIE HDL CHOLESTEROL: 39 MG/DL (ref 40–60)
NON-UH HIE K: 4 MMOL/L (ref 3.5–5.1)
NON-UH HIE NA: 144 MMOL/L (ref 135–145)
NON-UH HIE TOTAL CHOL/HDL CHOL RATIO: 3.4
NON-UH HIE TOTAL PROTEIN: 7.3 G/DL (ref 5.7–8.2)
NON-UH HIE TRIGLYCERIDES: 95 MG/DL (ref 30–150)

## 2025-05-15 RX ORDER — LOSARTAN POTASSIUM 100 MG/1
100 TABLET ORAL DAILY
Qty: 90 TABLET | Refills: 3 | Status: SHIPPED | OUTPATIENT
Start: 2025-05-15 | End: 2026-05-15

## 2025-05-15 NOTE — PATIENT INSTRUCTIONS
Increase losartan to 100 mg daily for BP  Exercise nuc stress test-SOB/CAD  Echo-SOB  CXR=SOB  Heart healthy diet= more plants the better  Weight loss

## 2025-05-15 NOTE — PROGRESS NOTES
Referred by Dr. Anderson ref. provider found for New Patient Visit, Atrial Fibrillation, Hypertension, and Coronary Artery Disease     History Of Present Illness:    Tani Mo is a 64 y.o. male with history of CAD and hyperlipidemia and paroxysmal atrial fibrillation presenting for cardiac follow-up.  He was formally seen by Dr. Schroeder  Occasional skipped heart beat -no sustained racing of the heart   Does have persistent YORK-some cough in AM.No wheezing    Patient denies chest pain/dizziness/lightheadedness/edema/claudication    No regular exercise     Past Medical History:  He has a past medical history of Benign essential hypertension (10/27/2023), Hyperlipidemia (10/27/2023), Paroxysmal atrial fibrillation (Multi) (10/27/2023), Personal history of other medical treatment, and Personal history of other medical treatment.    Past Surgical History:  He has no past surgical history on file.      Social History:  He reports that he has quit smoking. His smoking use included cigarettes. He has never used smokeless tobacco. No history on file for alcohol use and drug use.    Family History:  CAD in mother and father (CABG)     Allergies:  Bee venom protein (honey bee)    Outpatient Medications:  Current Outpatient Medications   Medication Instructions    aspirin 81 mg, oral, Daily    coenzyme Q-10 200 mg capsule TAKE AS DIRECTED.    dilTIAZem CD (CARDIZEM CD) 180 mg, oral, Daily    Eliquis 5 mg, oral, 2 times daily    EPINEPHrine (EPIPEN) 0.3 mg, intramuscular, As needed, Call 911 after use.    ezetimibe (ZETIA) 10 mg, oral, Daily    losartan (COZAAR) 50 mg, oral, Daily    multivitamin (MULTIPLE VITAMINS ORAL) 1 tablet, Daily    pantoprazole (PROTONIX) 40 mg, oral, Daily before breakfast    pravastatin (Pravachol) 40 mg tablet TAKE 1 TABLET( 40 MG) BY MOUTH ONCE DAILY        Last Recorded Vitals:  Vitals:    05/15/25 1123   BP: 128/74   BP Location: Left arm   Patient Position: Sitting   BP Cuff Size: Large adult  "  Pulse: 61   SpO2: 96%   Weight: 104 kg (230 lb)       Physical Exam:  Constitutional:       Appearance: Healthy appearance. Not in distress. Obese.   Neck:      Vascular: No JVR. JVD normal.   Pulmonary:      Effort: Pulmonary effort is normal.      Breath sounds: Normal breath sounds. No wheezing. No rhonchi. No rales.   Chest:      Chest wall: Not tender to palpatation.   Cardiovascular:      PMI at left midclavicular line. Normal rate. Regular rhythm. Normal S1. Normal S2.       Murmurs: There is no murmur.      No gallop.  No click. No rub.   Pulses:     Intact distal pulses.   Edema:     Peripheral edema absent.   Abdominal:      General: Bowel sounds are normal.      Palpations: Abdomen is soft.      Tenderness: There is no abdominal tenderness.   Musculoskeletal: Normal range of motion.         General: No tenderness. Skin:     General: Skin is warm and dry.   Neurological:      General: No focal deficit present.      Mental Status: Alert and oriented to person, place and time.       @PESEC->PESEC(CIRCULAR REFERENCE)@         Last Labs:  CBC -  Lab Results   Component Value Date    WBC 8.1 04/05/2023    HGB 15.8 04/05/2023    HCT 48.0 04/05/2023    MCV 92 04/05/2023     04/05/2023       CMP -  Lab Results   Component Value Date    CALCIUM 9.5 04/05/2023    PROT 7.1 04/05/2023    ALBUMIN 4.2 04/05/2023    AST 23 04/05/2023    ALT 28 04/05/2023    ALKPHOS 66 04/05/2023    BILITOT 1.5 (H) 04/05/2023       LIPID PANEL -   Lab Results   Component Value Date    CHOL 180 04/05/2023    TRIG 158 (H) 04/05/2023    HDL 40.3 04/05/2023    CHHDL 4.5 04/05/2023    LDLF 108 (H) 04/05/2023    VLDL 32 04/05/2023     No results found for: \"LDLCALC\"  RENAL FUNCTION PANEL -   Lab Results   Component Value Date    GLUCOSE 100 (H) 04/05/2023     04/05/2023    K 4.3 04/05/2023     04/05/2023    CO2 26 04/05/2023    ANIONGAP 14 04/05/2023    BUN 15 04/05/2023    CREATININE 1.11 04/05/2023    GFRMALE 74 " "04/05/2023    CALCIUM 9.5 04/05/2023    ALBUMIN 4.2 04/05/2023        No results found for: \"BNP\", \"HGBA1C\"    Last Cardiology Tests:  ECG:  No results found for this or any previous visit (from the past 4464 hours).     Echo:  No results found for this or any previous visit from the past 1095 days.      Ejection Fractions:  No results found for: \"EF\"    Cath:  No results found for this or any previous visit from the past 1095 days.      Stress Test:  Nuclear Stress Test 07/28/2022  NL perfusion    Cardiac Imaging:  No results found for this or any previous visit from the past 1095 days.        Lab review: I have personally reviewed the laboratory result(s)     Assessment/Plan   Problem List Items Addressed This Visit       Benign essential hypertension (Chronic)    Overview   Blood pressure elevated in office today  Increase losartan  10/2024 BMP was okay  Recheck BMP pending         Coronary artery disease    Overview   Patient underwent drug-eluting Stent to RCA  7/2018 7/2022 stress test with normal perfusion and EF 50%  No definite angina or ischemic EKG changes however does have dyspnea on exertion therefore check nuclear stress test  On aspirin and statin         Relevant Orders    Transthoracic echo (TTE) complete    Nuclear Stress Test    XR chest 2 views    Follow Up In Cardiology    Hyperlipidemia (Chronic)    Overview   Patient is on moderate intensity statin therapy/Zetia may be-high intensity statin therapy resulted in significant myalgias.  10/2024 LDL equal 81  Follow heart healthy diet  Recheck lipids pending-would be agreeable to consider a PCSK9 inhibitor if LDL above 70         Paroxysmal atrial fibrillation (Multi) - Primary (Chronic)    Overview   He is status post cryoablation   He is in normal sinus rhythm today   He is on Eliquis          Relevant Medications    losartan (Cozaar) 100 mg tablet    Other Relevant Orders    ECG 12 lead (Clinic Performed)    ECG 12 Lead (Completed)    " Shortness of breath    Overview   Uncertain etiology  In normal sinus rhythm with no signs of CHF  Patient thinks he may have COPD but he does not have any wheeze on exam or complaint of these at home-recheck chest x-ray  Also possible anginal equivalent-check nuclear stress test  Check echo             Increase losartan to 100 mg daily for BP  Exercise nuc stress test-SOB/CAD  Echo-SOB  CXR=SOB  Heart healthy diet= more plants the better  Weight loss    Stanislav Wright, DO

## 2025-06-11 ENCOUNTER — APPOINTMENT (OUTPATIENT)
Dept: RADIOLOGY | Facility: CLINIC | Age: 65
End: 2025-06-11
Payer: COMMERCIAL

## 2025-06-11 ENCOUNTER — HOSPITAL ENCOUNTER (OUTPATIENT)
Dept: RADIOLOGY | Facility: CLINIC | Age: 65
Discharge: HOME | End: 2025-06-11
Payer: COMMERCIAL

## 2025-06-11 ENCOUNTER — APPOINTMENT (OUTPATIENT)
Dept: CARDIOLOGY | Facility: CLINIC | Age: 65
End: 2025-06-11
Payer: COMMERCIAL

## 2025-06-11 ENCOUNTER — HOSPITAL ENCOUNTER (OUTPATIENT)
Dept: CARDIOLOGY | Facility: CLINIC | Age: 65
Discharge: HOME | End: 2025-06-11
Payer: COMMERCIAL

## 2025-06-11 VITALS
DIASTOLIC BLOOD PRESSURE: 88 MMHG | BODY MASS INDEX: 31.15 KG/M2 | WEIGHT: 230 LBS | HEIGHT: 72 IN | SYSTOLIC BLOOD PRESSURE: 155 MMHG

## 2025-06-11 DIAGNOSIS — R07.9 CHEST PAIN, UNSPECIFIED: ICD-10-CM

## 2025-06-11 DIAGNOSIS — I25.10 CORONARY ARTERY DISEASE INVOLVING NATIVE HEART WITHOUT ANGINA PECTORIS, UNSPECIFIED VESSEL OR LESION TYPE: ICD-10-CM

## 2025-06-11 PROCEDURE — 93306 TTE W/DOPPLER COMPLETE: CPT

## 2025-06-11 PROCEDURE — 71046 X-RAY EXAM CHEST 2 VIEWS: CPT

## 2025-06-11 PROCEDURE — 93306 TTE W/DOPPLER COMPLETE: CPT | Performed by: INTERNAL MEDICINE

## 2025-06-11 PROCEDURE — 71046 X-RAY EXAM CHEST 2 VIEWS: CPT | Performed by: RADIOLOGY

## 2025-06-14 LAB
AORTIC VALVE MEAN GRADIENT: 2 MMHG
AORTIC VALVE PEAK VELOCITY: 1.05 M/S
AV PEAK GRADIENT: 4 MMHG
AVA (PEAK VEL): 2.49 CM2
AVA (VTI): 2.2 CM2
EJECTION FRACTION APICAL 4 CHAMBER: 53.1
EJECTION FRACTION: 58 %
LEFT ATRIUM VOLUME AREA LENGTH INDEX BSA: 32.2 ML/M2
LEFT VENTRICLE INTERNAL DIMENSION DIASTOLE: 4.42 CM (ref 3.5–6)
LEFT VENTRICULAR OUTFLOW TRACT DIAMETER: 2.04 CM
MITRAL VALVE E/A RATIO: 0.93
RIGHT VENTRICLE FREE WALL PEAK S': 0.7 CM/S
RIGHT VENTRICLE PEAK SYSTOLIC PRESSURE: 22 MMHG
TRICUSPID ANNULAR PLANE SYSTOLIC EXCURSION: 1.9 CM

## 2025-06-23 ENCOUNTER — HOSPITAL ENCOUNTER (OUTPATIENT)
Dept: RADIOLOGY | Facility: HOSPITAL | Age: 65
Discharge: HOME | End: 2025-06-23
Payer: COMMERCIAL

## 2025-06-23 ENCOUNTER — HOSPITAL ENCOUNTER (OUTPATIENT)
Dept: CARDIOLOGY | Facility: HOSPITAL | Age: 65
Discharge: HOME | End: 2025-06-23
Payer: COMMERCIAL

## 2025-06-23 DIAGNOSIS — I25.10 CORONARY ARTERY DISEASE INVOLVING NATIVE HEART WITHOUT ANGINA PECTORIS, UNSPECIFIED VESSEL OR LESION TYPE: ICD-10-CM

## 2025-06-23 DIAGNOSIS — T78.40XS ALLERGIC REACTION, SEQUELA: ICD-10-CM

## 2025-06-23 PROCEDURE — 78452 HT MUSCLE IMAGE SPECT MULT: CPT | Performed by: INTERNAL MEDICINE

## 2025-06-23 PROCEDURE — 93018 CV STRESS TEST I&R ONLY: CPT | Performed by: INTERNAL MEDICINE

## 2025-06-23 PROCEDURE — 93017 CV STRESS TEST TRACING ONLY: CPT

## 2025-06-23 PROCEDURE — 78452 HT MUSCLE IMAGE SPECT MULT: CPT

## 2025-06-23 PROCEDURE — 93016 CV STRESS TEST SUPVJ ONLY: CPT | Performed by: INTERNAL MEDICINE

## 2025-06-23 PROCEDURE — A9502 TC99M TETROFOSMIN: HCPCS | Performed by: INTERNAL MEDICINE

## 2025-06-23 PROCEDURE — 3430000001 HC RX 343 DIAGNOSTIC RADIOPHARMACEUTICALS: Performed by: INTERNAL MEDICINE

## 2025-06-23 PROCEDURE — 2500000004 HC RX 250 GENERAL PHARMACY W/ HCPCS (ALT 636 FOR OP/ED): Performed by: INTERNAL MEDICINE

## 2025-06-23 RX ORDER — EPINEPHRINE 0.3 MG/.3ML
INJECTION SUBCUTANEOUS
Qty: 2 EACH | Refills: 2 | Status: SHIPPED | OUTPATIENT
Start: 2025-06-23

## 2025-06-23 RX ORDER — REGADENOSON 0.08 MG/ML
0.4 INJECTION, SOLUTION INTRAVENOUS ONCE
Status: COMPLETED | OUTPATIENT
Start: 2025-06-23 | End: 2025-06-23

## 2025-06-23 RX ADMIN — TETROFOSMIN 9.7 MILLICURIE: 0.23 INJECTION, POWDER, LYOPHILIZED, FOR SOLUTION INTRAVENOUS at 09:15

## 2025-06-23 RX ADMIN — REGADENOSON 0.4 MG: 0.08 INJECTION, SOLUTION INTRAVENOUS at 12:09

## 2025-06-23 RX ADMIN — TETROFOSMIN 37.8 MILLICURIE: 0.23 INJECTION, POWDER, LYOPHILIZED, FOR SOLUTION INTRAVENOUS at 12:10

## 2025-06-26 ENCOUNTER — APPOINTMENT (OUTPATIENT)
Dept: CARDIOLOGY | Facility: CLINIC | Age: 65
End: 2025-06-26
Payer: COMMERCIAL

## 2025-06-26 ENCOUNTER — TELEPHONE (OUTPATIENT)
Dept: CARDIOLOGY | Facility: CLINIC | Age: 65
End: 2025-06-26

## 2025-06-26 VITALS
DIASTOLIC BLOOD PRESSURE: 70 MMHG | SYSTOLIC BLOOD PRESSURE: 125 MMHG | HEART RATE: 66 BPM | OXYGEN SATURATION: 96 % | WEIGHT: 225 LBS | BODY MASS INDEX: 30.52 KG/M2

## 2025-06-26 DIAGNOSIS — G47.33 OBSTRUCTIVE SLEEP APNEA SYNDROME: ICD-10-CM

## 2025-06-26 DIAGNOSIS — I25.10 CORONARY ARTERY DISEASE INVOLVING NATIVE HEART WITHOUT ANGINA PECTORIS, UNSPECIFIED VESSEL OR LESION TYPE: ICD-10-CM

## 2025-06-26 DIAGNOSIS — I48.0 PAROXYSMAL ATRIAL FIBRILLATION (MULTI): Chronic | ICD-10-CM

## 2025-06-26 DIAGNOSIS — E78.2 MIXED HYPERLIPIDEMIA: Chronic | ICD-10-CM

## 2025-06-26 DIAGNOSIS — I10 BENIGN ESSENTIAL HYPERTENSION: Primary | Chronic | ICD-10-CM

## 2025-06-26 DIAGNOSIS — R06.02 SHORTNESS OF BREATH: ICD-10-CM

## 2025-06-26 DIAGNOSIS — E66.811 CLASS 1 OBESITY WITH BODY MASS INDEX (BMI) OF 31.0 TO 31.9 IN ADULT, UNSPECIFIED OBESITY TYPE, UNSPECIFIED WHETHER SERIOUS COMORBIDITY PRESENT: ICD-10-CM

## 2025-06-26 PROCEDURE — 1159F MED LIST DOCD IN RCRD: CPT | Performed by: INTERNAL MEDICINE

## 2025-06-26 PROCEDURE — 3078F DIAST BP <80 MM HG: CPT | Performed by: INTERNAL MEDICINE

## 2025-06-26 PROCEDURE — 99214 OFFICE O/P EST MOD 30 MIN: CPT | Performed by: INTERNAL MEDICINE

## 2025-06-26 PROCEDURE — 1036F TOBACCO NON-USER: CPT | Performed by: INTERNAL MEDICINE

## 2025-06-26 PROCEDURE — 3074F SYST BP LT 130 MM HG: CPT | Performed by: INTERNAL MEDICINE

## 2025-06-26 NOTE — PROGRESS NOTES
Chief Complaint:   Atrial Fibrillation, Coronary Artery Disease, Cardiac Stress Test, and echo     History Of Present Illness:    Tani Mo is a 64 y.o. male presenting after cardiac testing.  Still some YORK-denies cough/wheeze  Intermittent skipped beats  Patient denies chest pain/palpitations/dizziness/lightheadedness/edema/claudication  Complains of leg cramps from statin   No regular exercise  No bleeding with Eliquis  Last Recorded Vitals:  Vitals:    06/26/25 1253   BP: 110/62   BP Location: Left arm   Patient Position: Sitting   BP Cuff Size: Large adult   Pulse: 66   SpO2: 96%   Weight: 102 kg (225 lb)            Allergies:  Bee venom protein (honey bee)    Outpatient Medications:  Current Outpatient Medications   Medication Instructions    aspirin 81 mg, oral, Daily    coenzyme Q-10 200 mg capsule TAKE AS DIRECTED.    dilTIAZem CD (CARDIZEM CD) 180 mg, oral, Daily    Eliquis 5 mg, oral, 2 times daily    EPINEPHrine 0.3 mg/0.3 mL injection syringe INJECT 0.3 ML (0.3 MG) INTO THE MUSCLE IF NEEDED FOR ANAPHYLAXIS. CALL 911 AFTER USE.    ezetimibe (ZETIA) 10 mg, oral, Daily    losartan (COZAAR) 100 mg, oral, Daily    multivitamin (MULTIPLE VITAMINS ORAL) 1 tablet, Daily    pantoprazole (PROTONIX) 40 mg, oral, Daily before breakfast    pravastatin (Pravachol) 40 mg tablet TAKE 1 TABLET( 40 MG) BY MOUTH ONCE DAILY       Physical Exam:  Constitutional:       Appearance: Healthy appearance. Not in distress. Obese.   Neck:      Vascular: No JVR. JVD normal.   Pulmonary:      Effort: Pulmonary effort is normal.      Breath sounds: Normal breath sounds. No wheezing. No rhonchi. No rales.   Chest:      Chest wall: Not tender to palpatation.   Cardiovascular:      PMI at left midclavicular line. Normal rate. Regular rhythm. Normal S1. Normal S2.       Murmurs: There is no murmur.      No gallop.  No click. No rub.   Pulses:     Intact distal pulses.   Edema:     Peripheral edema absent.   Abdominal:      General:  "Bowel sounds are normal.      Palpations: Abdomen is soft.      Tenderness: There is no abdominal tenderness.   Musculoskeletal: Normal range of motion.         General: No tenderness. Skin:     General: Skin is warm and dry.   Neurological:      General: No focal deficit present.      Mental Status: Alert and oriented to person, place and time.          Last Labs:  CBC -  Lab Results   Component Value Date    WBC 8.1 04/05/2023    HGB 15.8 04/05/2023    HCT 48.0 04/05/2023    MCV 92 04/05/2023     04/05/2023       CMP -  Lab Results   Component Value Date    CALCIUM 9.5 04/05/2023    PROT 7.1 04/05/2023    ALBUMIN 4.2 04/05/2023    AST 23 04/05/2023    ALT 28 04/05/2023    ALKPHOS 66 04/05/2023    BILITOT 1.5 (H) 04/05/2023       LIPID PANEL -   Lab Results   Component Value Date    CHOL 180 04/05/2023    TRIG 158 (H) 04/05/2023    HDL 40.3 04/05/2023    CHHDL 4.5 04/05/2023    VLDL 32 04/05/2023 5/2025  LDL 76  HDL 39   Total 134          RENAL FUNCTION PANEL -   Lab Results   Component Value Date    GLUCOSE 100 (H) 04/05/2023     04/05/2023    K 4.3 04/05/2023     04/05/2023    CO2 26 04/05/2023    ANIONGAP 14 04/05/2023    BUN 15 04/05/2023    CREATININE 1.11 04/05/2023    GFRMALE 74 04/05/2023    CALCIUM 9.5 04/05/2023    ALBUMIN 4.2 04/05/2023 5/2025  Creatinine 1.2  Potassium 4    No results found for: \"BNP\", \"HGBA1C\"              Lab review: I have personally reviewed the laboratory result(s)       Problem List Items Addressed This Visit       Coronary artery disease    Overview   Patient underwent drug-eluting Stent to RCA  7/2018 7/2022 stress test with normal perfusion and EF 50%  No definite angina or ischemic EKG changes however does have dyspnea on exertion therefore check nuclear stress test  On aspirin and statin          Stop pravastatin with leg pain  Start nexletol for cholesterol  Follow heart healthy diet=more plants the better  Weight loss  Increase activity=walk 30 " minutes daily  Stanislav Wright, DO

## 2025-06-26 NOTE — PATIENT INSTRUCTIONS
Stop pravastatin with leg pain  Start nexletol for cholesterol  Follow heart healthy diet=more plants the better  Weight loss  Increase activity=walk 30 minutes daily

## 2025-06-30 ENCOUNTER — TELEPHONE (OUTPATIENT)
Dept: CARDIOLOGY | Facility: CLINIC | Age: 65
End: 2025-06-30
Payer: COMMERCIAL

## 2025-06-30 DIAGNOSIS — E78.2 MIXED HYPERLIPIDEMIA: ICD-10-CM

## 2025-06-30 DIAGNOSIS — I25.10 CORONARY ARTERY DISEASE INVOLVING NATIVE HEART WITHOUT ANGINA PECTORIS, UNSPECIFIED VESSEL OR LESION TYPE: ICD-10-CM

## 2025-06-30 NOTE — TELEPHONE ENCOUNTER
"Patient called to report his insurance authorized Nexletol but it is still going to cost him $140/mos and he is unable to afford that.    Patient is asking if we can start auth process for \"one of the injectable medications\"?      Last OV 6/26/25  Patient underwent drug-eluting Stent to RCA  7/2018 7/2022 stress test with normal perfusion and EF 50%    Stop pravastatin with leg pain  Start nexletol for cholesterol    Lipid Panel 5/15/25  LDL 76    "

## 2025-07-01 ENCOUNTER — SPECIALTY PHARMACY (OUTPATIENT)
Dept: PHARMACY | Facility: CLINIC | Age: 65
End: 2025-07-01

## 2025-07-03 ENCOUNTER — APPOINTMENT (OUTPATIENT)
Dept: PRIMARY CARE | Facility: CLINIC | Age: 65
End: 2025-07-03
Payer: COMMERCIAL

## 2025-07-03 VITALS
SYSTOLIC BLOOD PRESSURE: 121 MMHG | BODY MASS INDEX: 30.61 KG/M2 | HEIGHT: 72 IN | OXYGEN SATURATION: 91 % | WEIGHT: 226 LBS | DIASTOLIC BLOOD PRESSURE: 77 MMHG | HEART RATE: 74 BPM

## 2025-07-03 DIAGNOSIS — E78.5 HYPERLIPIDEMIA, UNSPECIFIED HYPERLIPIDEMIA TYPE: ICD-10-CM

## 2025-07-03 DIAGNOSIS — I10 BENIGN ESSENTIAL HYPERTENSION: Primary | ICD-10-CM

## 2025-07-03 PROCEDURE — 3008F BODY MASS INDEX DOCD: CPT | Performed by: FAMILY MEDICINE

## 2025-07-03 PROCEDURE — 3074F SYST BP LT 130 MM HG: CPT | Performed by: FAMILY MEDICINE

## 2025-07-03 PROCEDURE — 99214 OFFICE O/P EST MOD 30 MIN: CPT | Performed by: FAMILY MEDICINE

## 2025-07-03 PROCEDURE — 3078F DIAST BP <80 MM HG: CPT | Performed by: FAMILY MEDICINE

## 2025-07-03 PROCEDURE — 1158F ADVNC CARE PLAN TLK DOCD: CPT | Performed by: FAMILY MEDICINE

## 2025-07-03 ASSESSMENT — ENCOUNTER SYMPTOMS
CARDIOVASCULAR NEGATIVE: 1
GASTROINTESTINAL NEGATIVE: 1
MUSCULOSKELETAL NEGATIVE: 1
CONSTITUTIONAL NEGATIVE: 1
NEUROLOGICAL NEGATIVE: 1
RESPIRATORY NEGATIVE: 1

## 2025-07-03 ASSESSMENT — PATIENT HEALTH QUESTIONNAIRE - PHQ9
2. FEELING DOWN, DEPRESSED OR HOPELESS: NOT AT ALL
1. LITTLE INTEREST OR PLEASURE IN DOING THINGS: NOT AT ALL
SUM OF ALL RESPONSES TO PHQ9 QUESTIONS 1 AND 2: 0

## 2025-07-03 NOTE — PROGRESS NOTES
Subjective   Patient ID: Tani Mo is a 64 y.o. male who presents for Follow-up.  HPI  Hld  Htn    Review of Systems   Constitutional: Negative.    HENT: Negative.     Respiratory: Negative.     Cardiovascular: Negative.    Gastrointestinal: Negative.    Genitourinary: Negative.    Musculoskeletal: Negative.    Neurological: Negative.        Objective   Physical Exam  Constitutional:       Appearance: Normal appearance.   HENT:      Head: Normocephalic and atraumatic.      Mouth/Throat:      Mouth: Mucous membranes are moist.   Cardiovascular:      Rate and Rhythm: Normal rate and regular rhythm.   Pulmonary:      Effort: Pulmonary effort is normal.   Musculoskeletal:         General: Normal range of motion.   Neurological:      Mental Status: He is alert.         Assessment/Plan   Problem List Items Addressed This Visit    None           Franklyn Velasquez DO 07/03/25 3:19 PM

## 2025-07-21 ENCOUNTER — PHARMACY VISIT (OUTPATIENT)
Dept: PHARMACY | Facility: CLINIC | Age: 65
End: 2025-07-21
Payer: MEDICARE

## 2025-07-21 ENCOUNTER — SPECIALTY PHARMACY (OUTPATIENT)
Dept: PHARMACY | Facility: CLINIC | Age: 65
End: 2025-07-21

## 2025-07-21 PROCEDURE — RXMED WILLOW AMBULATORY MEDICATION CHARGE

## 2025-07-30 DIAGNOSIS — K21.9 GASTROESOPHAGEAL REFLUX DISEASE WITHOUT ESOPHAGITIS: ICD-10-CM

## 2025-07-31 RX ORDER — PANTOPRAZOLE SODIUM 40 MG/1
TABLET, DELAYED RELEASE ORAL
Qty: 90 TABLET | Refills: 0 | Status: SHIPPED | OUTPATIENT
Start: 2025-07-31

## 2025-08-12 ENCOUNTER — SPECIALTY PHARMACY (OUTPATIENT)
Dept: PHARMACY | Facility: CLINIC | Age: 65
End: 2025-08-12

## 2025-08-21 ENCOUNTER — TELEPHONE (OUTPATIENT)
Dept: CARDIOLOGY | Facility: CLINIC | Age: 65
End: 2025-08-21
Payer: COMMERCIAL

## 2025-08-21 DIAGNOSIS — E78.49 OTHER HYPERLIPIDEMIA: ICD-10-CM

## 2025-09-03 RX ORDER — PRAVASTATIN SODIUM 40 MG/1
40 TABLET ORAL NIGHTLY
Qty: 90 TABLET | Refills: 3 | Status: SHIPPED | OUTPATIENT
Start: 2025-09-03

## 2025-09-03 RX ORDER — EZETIMIBE 10 MG/1
10 TABLET ORAL DAILY
Qty: 90 TABLET | Refills: 3 | Status: SHIPPED | OUTPATIENT
Start: 2025-09-03

## 2025-09-09 ENCOUNTER — APPOINTMENT (OUTPATIENT)
Dept: CARDIOLOGY | Facility: CLINIC | Age: 65
End: 2025-09-09
Payer: COMMERCIAL

## 2025-12-10 ENCOUNTER — APPOINTMENT (OUTPATIENT)
Dept: CARDIOLOGY | Facility: CLINIC | Age: 65
End: 2025-12-10
Payer: COMMERCIAL

## 2026-01-05 ENCOUNTER — APPOINTMENT (OUTPATIENT)
Dept: PRIMARY CARE | Facility: CLINIC | Age: 66
End: 2026-01-05
Payer: COMMERCIAL

## 2026-01-06 ENCOUNTER — APPOINTMENT (OUTPATIENT)
Dept: CARDIOLOGY | Facility: CLINIC | Age: 66
End: 2026-01-06
Payer: COMMERCIAL